# Patient Record
Sex: MALE | Race: WHITE | NOT HISPANIC OR LATINO | Employment: FULL TIME
[De-identification: names, ages, dates, MRNs, and addresses within clinical notes are randomized per-mention and may not be internally consistent; named-entity substitution may affect disease eponyms.]

---

## 2020-11-29 ENCOUNTER — HEALTH MAINTENANCE LETTER (OUTPATIENT)
Age: 35
End: 2020-11-29

## 2021-09-19 ENCOUNTER — HEALTH MAINTENANCE LETTER (OUTPATIENT)
Age: 36
End: 2021-09-19

## 2021-10-25 ENCOUNTER — LAB (OUTPATIENT)
Dept: LAB | Facility: CLINIC | Age: 36
End: 2021-10-25
Payer: COMMERCIAL

## 2021-10-25 DIAGNOSIS — Z31.440 ENCOUNTER OF MALE FOR TESTING FOR GENETIC DISEASE CARRIER STATUS FOR PROCREATIVE MANAGEMENT: ICD-10-CM

## 2021-10-25 DIAGNOSIS — Z13.71 SCREENING FOR GENETIC DISEASE CARRIER STATUS: ICD-10-CM

## 2021-10-25 DIAGNOSIS — Z71.83 ENCOUNTER FOR NONPROCREATIVE GENETIC COUNSELING: ICD-10-CM

## 2021-10-25 DIAGNOSIS — Z71.83 ENCOUNTER FOR NONPROCREATIVE GENETIC COUNSELING: Primary | ICD-10-CM

## 2021-10-25 PROCEDURE — 36415 COLL VENOUS BLD VENIPUNCTURE: CPT

## 2021-10-25 NOTE — PROGRESS NOTES
"2021    Shree \"Neo\" LIEN Padgett accompanied his wife, Alyssa Padgett, to her Maternal Fetal Medicine (MFM) genetic counseling appointment today as part of her high risk pregnancy care with our team. During today's consult, Shree Padgett and his partner each elected to proceed with expanded carrier screening via [employer] to better understand reproductive risks to the current pregnancy, their existing children together, and any possible future pregnancies. Specifically, after a review of the benefits, risks, utility, and limitations of carrier screening, Neo consented to undergoing the comprehensive carrier screening panel via [employer] (includes 289 conditions for males).     The patient reports that he and the mother of the pregnancy have  ancestry. Neo is of Iraqi, Bahraini, Syrian, and Polish ancestry; Alyssa is of Cam[place]onian Egyptian, English, and mixed  ancestry:      Cystic fibrosis is an autosomal recessive genetic condition that occurs with increased frequency in individuals of  ancestry and carrier screening for this condition is available.  In addition,  screening in the [facility] includes cystic fibrosis.        Expanded carrier screening for mutations in a large panel of genes associated with autosomal recessive conditions including cystic fibrosis, Thalassemias, hearing loss, spinal muscular atrophy, and others, is now available. We also reviewed that expanded carrier screening can assess for common X-linked recessive disorders such as Fragile X syndrome.       We discussed that expanded carrier screening is designed to identify carrier status for conditions that are primarily childhood or adolescent onset. Expanded carrier screening does not evaluate for adult-onset conditions such as hereditary cancer syndromes, Union disease, dementia/ Alzheimer's disease, or cardiovascular disease risk factors. Additionally, expanded carrier " screening is not comprehensive for all known genetic diseases or inherited conditions. This is a screening test, and residual carrier status risk figures will be provided to the patient after results become available. Carrier screening is not meant to diagnose the patient with a condition, and generally carriers are asymptomatic. However, certain genes may confer increased risks for various health concerns in carriers (for example, but not limited to: TITA, FMR1, DMD).      Shree Padgett has provided permission for [facility] genetic counseling to call Alyssa with the couple's combined carrier screening results once available in 2-3 weeks from today. Consent to communicate forms were completed in-person and are scanned into the couple's respective charts. Alyssa has requested that a callback number only alerting her that the couple's results are available for review with a genetic counselor be left at her mobile telephone number. The couple have agreed to have [facility] genetic counseling reach out after both of the couple's results are back from [facility]. All of Neo's and his partner's questions were addressed to their apparent satisfaction today.     Total in-person consult time for carrier screening: 15 minutes of a total 45 minute genetic counseling consult.     Haritha Farmer MS, [facility]  Genetic Counselor  [facility]  Maternal Fetal Medicine  Ph: [phone] ([place])  Ph: [phone] ([facility])

## 2021-11-12 ENCOUNTER — DOCUMENTATION ONLY (OUTPATIENT)
Dept: MATERNAL FETAL MEDICINE | Facility: CLINIC | Age: 36
End: 2021-11-12
Payer: COMMERCIAL

## 2021-11-12 NOTE — PROGRESS NOTES
"November 11, 2021    The following information was taken from Neo's wife's chart per couple request for disclosure of their combined expanded carrier screening results. Neo's results can be found scanned in under the laboratory tab in his chart     \"I called the patient to discuss her and her  Neo's [facility] genetics carrier screening results. The couple were identified as carriers for a variety of autosomal recessive conditions, however they were not found to be carriers for the same condition on the comprehensive 289-gene carrier screening panel. Therefore the couple's reproductive risks for having a child with any one of the conditions screened for on this panel have been significantly reduced.      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 Galactosemia is an autosomal recessive condition caused by harmful changes in the GALT gene. Clinical features of the condition include the inability to break down galactose which can lead to organ damage, lethargy, jaundice, cataracts, intellectual disabilities, and liver damage shortly after birth. Galactose is commonly found in many dairy products and many baby formulas. Treatment of this condition involves a low galactose diet to prevent onset of symptoms. The Beatty variant, when in trans with another disease causing variant in GALT, is expected to lead to a more mild-moderate form of the hereditary condition.      Because Neo was not determined to be a carrier for this autosomal recessive condition, remaining residual risks for the couple to have an affected child with Galactosemia is 1 in 39,000 (1/9900 x 1/4).     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 Ceroid lipofuscinosis, neuronal type 1 (CLN1) is a neurodegenerative condition " resulting from storage material damaging brain cells (cerebral and cerebellar atrophy). Age of onset can vary. Classic infantile CLN1 typically presents between six and 24 months of age with progressive loss of motor skills which causes problems with balance and coordination (ataxia), as well as low muscle tone (hypotonia), developmental delay, loss of cognitive abilities, difficulty coordinating speech (dysarthria), vision loss, and seizures. Life span is significantly reduced, with few individuals living beyond the early teen years.     Because Neo was not determined to be a carrier for this autosomal recessive condition, remaining residual risks for the couple to have an affected child with PPT1-related CLN is 1 in 39,000 (1/9900 x 1/4).     Neo's results  1) Neo was identified to be a carrier for [id number]-jsahfuc conditions. His specific low penetrance variant is the following: WNT10A c.682T>A (p.[id number]). [id number] conditions include autosomal recessive odonto-onycho-dermal dysplasia (OODD) and Ehvösn-Xenzmb-Drlbwgjq syndrome (SSPS) and autosomal dominant isolated tooth agenesis. Individuals with a clinically significant variant in this gene are carriers for the autosomal recessive conditions and may be at risk to develop the autosomal dominant condition associated with this gene, which was reviewed with Alyssa in detail today. Please note, the c.682T>A (p.[id number]) variant identified in this individual is known to have low penetrance for both the associated autosomal recessive and autosomal dominant conditions. This means that not all individuals with this genetic change will show signs or symptoms of the condition. Alyssa shared today that Neo's mother had missing permanent teeth that did not come in as she got older, possibly suggesting that this variant may have come from her and was then passed onto Neo, however this cannot be confirmed unless she were to undergo carrier screening for herself.  "Alyssa reports that Neo did not/does not have issues with congenital absence of his secondary teeth. Alyssa was able to verbalize her understanding that if a child of her and Neo's were to inherit this variant, they may be at risk for isolated tooth agenesis.      Because Alyssa was not determined to be a carrier for this condition, remaining residual risks for the couple to have an affected child with RTA50Y-vaqwgab conditions is 1 in 121,600 (1/30400 x 1/4).     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 The presence of a pseudodeficiency allele does not impact this individual's risk to be a carrier. Individuals with pseudodeficiency alleles may exhibit false positive results on related biochemical tests, including  screening; however, pseudodeficiency alleles are not known to cause disease, including Krabbe disease or metachromatic leukodystrophy. Carrier testing for the reproductive partner is not indicated.     We discussed that the couple can share this information with relatives if they feel comfortable. Siblings of the couple would be at a 50% chance of also being a carrier for the same condition. Alyssa wished for their results to be released to her from [employer] and provided verbal permission for this to occur via e-mail. Per patient request I released the results.\"         Haritha Farmer MS, [facility]  Genetic Counselor  [facility]  Maternal Fetal Medicine  Ph: [phone]  "

## 2022-01-09 ENCOUNTER — HEALTH MAINTENANCE LETTER (OUTPATIENT)
Age: 37
End: 2022-01-09

## 2022-08-24 ASSESSMENT — ENCOUNTER SYMPTOMS
WEAKNESS: 0
CONSTIPATION: 0
PALPITATIONS: 0
DIARRHEA: 0
HEARTBURN: 0
HEMATOCHEZIA: 0
MYALGIAS: 0
SORE THROAT: 0
DYSURIA: 0
FREQUENCY: 0
JOINT SWELLING: 0
NERVOUS/ANXIOUS: 1
ARTHRALGIAS: 1
DIZZINESS: 0
CHILLS: 0
NAUSEA: 0
FEVER: 0
EYE PAIN: 0
ABDOMINAL PAIN: 0
COUGH: 0
PARESTHESIAS: 0
SHORTNESS OF BREATH: 0
HEADACHES: 0
HEMATURIA: 0

## 2022-08-25 ENCOUNTER — OFFICE VISIT (OUTPATIENT)
Dept: FAMILY MEDICINE | Facility: CLINIC | Age: 37
End: 2022-08-25
Payer: COMMERCIAL

## 2022-08-25 VITALS
RESPIRATION RATE: 16 BRPM | OXYGEN SATURATION: 95 % | WEIGHT: 222.8 LBS | TEMPERATURE: 97 F | HEART RATE: 68 BPM | SYSTOLIC BLOOD PRESSURE: 123 MMHG | BODY MASS INDEX: 29.53 KG/M2 | HEIGHT: 73 IN | DIASTOLIC BLOOD PRESSURE: 81 MMHG

## 2022-08-25 DIAGNOSIS — M25.50 MULTIPLE JOINT PAIN: ICD-10-CM

## 2022-08-25 DIAGNOSIS — Z30.09 VASECTOMY EVALUATION: ICD-10-CM

## 2022-08-25 DIAGNOSIS — F40.248 FEAR OF PUBLIC SPEAKING: ICD-10-CM

## 2022-08-25 DIAGNOSIS — L98.9 SKIN LESION: ICD-10-CM

## 2022-08-25 DIAGNOSIS — M25.531 RIGHT WRIST PAIN: ICD-10-CM

## 2022-08-25 DIAGNOSIS — Z11.59 NEED FOR HEPATITIS C SCREENING TEST: ICD-10-CM

## 2022-08-25 DIAGNOSIS — Z00.00 ROUTINE GENERAL MEDICAL EXAMINATION AT A HEALTH CARE FACILITY: Primary | ICD-10-CM

## 2022-08-25 DIAGNOSIS — Z13.220 SCREENING FOR HYPERLIPIDEMIA: ICD-10-CM

## 2022-08-25 LAB
ANION GAP SERPL CALCULATED.3IONS-SCNC: 5 MMOL/L (ref 3–14)
BUN SERPL-MCNC: 12 MG/DL (ref 7–30)
CALCIUM SERPL-MCNC: 9.2 MG/DL (ref 8.5–10.1)
CCP AB SER IA-ACNC: 0.8 U/ML
CHLORIDE BLD-SCNC: 107 MMOL/L (ref 94–109)
CHOLEST SERPL-MCNC: 210 MG/DL
CO2 SERPL-SCNC: 28 MMOL/L (ref 20–32)
CREAT SERPL-MCNC: 0.92 MG/DL (ref 0.66–1.25)
CRP SERPL-MCNC: <2.9 MG/L (ref 0–8)
ERYTHROCYTE [SEDIMENTATION RATE] IN BLOOD BY WESTERGREN METHOD: 3 MM/HR (ref 0–15)
FASTING STATUS PATIENT QL REPORTED: YES
GFR SERPL CREATININE-BSD FRML MDRD: >90 ML/MIN/1.73M2
GLUCOSE BLD-MCNC: 100 MG/DL (ref 70–99)
HCV AB SERPL QL IA: NONREACTIVE
HDLC SERPL-MCNC: 53 MG/DL
LDLC SERPL CALC-MCNC: 136 MG/DL
NONHDLC SERPL-MCNC: 157 MG/DL
POTASSIUM BLD-SCNC: 4.4 MMOL/L (ref 3.4–5.3)
SODIUM SERPL-SCNC: 140 MMOL/L (ref 133–144)
TRIGL SERPL-MCNC: 104 MG/DL

## 2022-08-25 PROCEDURE — 99385 PREV VISIT NEW AGE 18-39: CPT | Performed by: NURSE PRACTITIONER

## 2022-08-25 PROCEDURE — 85652 RBC SED RATE AUTOMATED: CPT | Performed by: NURSE PRACTITIONER

## 2022-08-25 PROCEDURE — 80061 LIPID PANEL: CPT | Performed by: NURSE PRACTITIONER

## 2022-08-25 PROCEDURE — 86803 HEPATITIS C AB TEST: CPT | Performed by: NURSE PRACTITIONER

## 2022-08-25 PROCEDURE — 80048 BASIC METABOLIC PNL TOTAL CA: CPT | Performed by: NURSE PRACTITIONER

## 2022-08-25 PROCEDURE — 86038 ANTINUCLEAR ANTIBODIES: CPT | Performed by: NURSE PRACTITIONER

## 2022-08-25 PROCEDURE — 86431 RHEUMATOID FACTOR QUANT: CPT | Performed by: NURSE PRACTITIONER

## 2022-08-25 PROCEDURE — 86039 ANTINUCLEAR ANTIBODIES (ANA): CPT | Performed by: NURSE PRACTITIONER

## 2022-08-25 PROCEDURE — 86140 C-REACTIVE PROTEIN: CPT | Performed by: NURSE PRACTITIONER

## 2022-08-25 PROCEDURE — 86618 LYME DISEASE ANTIBODY: CPT | Performed by: NURSE PRACTITIONER

## 2022-08-25 PROCEDURE — 36415 COLL VENOUS BLD VENIPUNCTURE: CPT | Performed by: NURSE PRACTITIONER

## 2022-08-25 PROCEDURE — 86200 CCP ANTIBODY: CPT | Performed by: NURSE PRACTITIONER

## 2022-08-25 PROCEDURE — 99214 OFFICE O/P EST MOD 30 MIN: CPT | Mod: 25 | Performed by: NURSE PRACTITIONER

## 2022-08-25 RX ORDER — PROPRANOLOL HYDROCHLORIDE 10 MG/1
TABLET ORAL
Qty: 20 TABLET | Refills: 1 | Status: SHIPPED | OUTPATIENT
Start: 2022-08-25 | End: 2023-06-30

## 2022-08-25 RX ORDER — PROPRANOLOL HYDROCHLORIDE 10 MG/1
10 TABLET ORAL PRN
COMMUNITY
Start: 2021-01-29 | End: 2022-08-25

## 2022-08-25 ASSESSMENT — ENCOUNTER SYMPTOMS
JOINT SWELLING: 0
FREQUENCY: 0
EYE PAIN: 0
PARESTHESIAS: 0
HEADACHES: 0
NAUSEA: 0
DIZZINESS: 0
FEVER: 0
DYSURIA: 0
DIARRHEA: 0
SORE THROAT: 0
HEMATURIA: 0
MYALGIAS: 0
COUGH: 0
PALPITATIONS: 0
HEMATOCHEZIA: 0
SHORTNESS OF BREATH: 0
CHILLS: 0
NERVOUS/ANXIOUS: 1
HEARTBURN: 0
CONSTIPATION: 0
WEAKNESS: 0
ABDOMINAL PAIN: 0
ARTHRALGIAS: 1

## 2022-08-25 ASSESSMENT — ANXIETY QUESTIONNAIRES
5. BEING SO RESTLESS THAT IT IS HARD TO SIT STILL: SEVERAL DAYS
6. BECOMING EASILY ANNOYED OR IRRITABLE: SEVERAL DAYS
2. NOT BEING ABLE TO STOP OR CONTROL WORRYING: SEVERAL DAYS
GAD7 TOTAL SCORE: 6
7. FEELING AFRAID AS IF SOMETHING AWFUL MIGHT HAPPEN: NOT AT ALL
3. WORRYING TOO MUCH ABOUT DIFFERENT THINGS: SEVERAL DAYS
IF YOU CHECKED OFF ANY PROBLEMS ON THIS QUESTIONNAIRE, HOW DIFFICULT HAVE THESE PROBLEMS MADE IT FOR YOU TO DO YOUR WORK, TAKE CARE OF THINGS AT HOME, OR GET ALONG WITH OTHER PEOPLE: SOMEWHAT DIFFICULT
GAD7 TOTAL SCORE: 6
1. FEELING NERVOUS, ANXIOUS, OR ON EDGE: SEVERAL DAYS

## 2022-08-25 ASSESSMENT — PAIN SCALES - GENERAL: PAINLEVEL: NO PAIN (0)

## 2022-08-25 ASSESSMENT — PATIENT HEALTH QUESTIONNAIRE - PHQ9: 5. POOR APPETITE OR OVEREATING: SEVERAL DAYS

## 2022-08-25 NOTE — PROGRESS NOTES
SUBJECTIVE:   CC: Shree Padgett is an 37 year old male who presents for preventative health visit.       Patient has been advised of split billing requirements and indicates understanding: Yes  Healthy Habits:     Getting at least 3 servings of Calcium per day:  Yes    Bi-annual eye exam:  NO    Dental care twice a year:  Yes    Sleep apnea or symptoms of sleep apnea:  None    Diet:  Regular (no restrictions)    Frequency of exercise:  2-3 days/week    Duration of exercise:  15-30 minutes    Taking medications regularly:  No    Barriers to taking medications:  None    Medication side effects:  Not applicable    PHQ-2 Total Score: 0    Additional concerns today:  Yes        PROBLEMS TO ADD ON...  -------------------------------------  Referral for vasectomy     Discuss situational anxiety - takes propranolol PRN  KANDICE-7 SCORE 8/25/2022   Total Score 6     Referral for family/couples therapy - wife just had a baby     Today's PHQ-2 Score:   PHQ-2 ( 1999 [employer]) 8/24/2022   Q1: Little interest or pleasure in doing things 0   Q2: Feeling down, depressed or hopeless 0   PHQ-2 Score 0   Q1: Little interest or pleasure in doing things Not at all   Q2: Feeling down, depressed or hopeless Not at all   PHQ-2 Score 0       Abuse: Current or Past(Physical, Sexual or Emotional)- No  Do you feel safe in your environment? Yes    Have you ever done Advance Care Planning? (For example, a Health Directive, POLST, or a discussion with a medical provider or your loved ones about your wishes): No, advance care planning information given to patient to review.  Patient declined advance care planning discussion at this time.    Social History     Tobacco Use     Smoking status: Former Smoker     Packs/day: 1.00     Years: 15.00     Pack years: 15.00     Types: Cigarettes, Dip, chew, snus or snuff     Start date: 1/1/2004     Quit date: 1/1/2019     Years since quitting: 3.6     Smokeless tobacco: Former User     Quit date: 1/1/2019    Substance Use Topics     Alcohol use: Yes         Alcohol Use 8/24/2022   Prescreen: >3 drinks/day or >7 drinks/week? Yes   AUDIT SCORE  8       Last PSA: No results found for: PSA    Reviewed orders with patient. Reviewed health maintenance and updated orders accordingly - Yes  Current Outpatient Medications   Medication Sig Dispense Refill     Multiple Vitamin (ONE-A-DAY MENS PO) Take 1 tablet by mouth daily       propranolol (INDERAL) 10 MG tablet Take 1 tablet 15 to 20 minutes prior to speaking event. 20 tablet 1     No Known Allergies    Reviewed and updated as needed this visit by clinical staff   Tobacco  Allergies  Meds  Problems  Med Hx  Surg Hx  Fam Hx  Soc   Hx          Reviewed and updated as needed this visit by Provider      Problems                 Here today for physical.  Is fasting for labs.  Needs to have refill of propanolol.Takes propanolol as needed prior to giving presentations. Thinks takes about once per month.  It works very well when has to speak in public.    Has 2 children.  Wife does not desire any more children.  Would like to be referred for vasectomy.    All joints are aching. They do not get swollen. Does not take tylenol or ibuprofen for these pains.  Has had labs checked in the past that were normal. Does play volleyball and other sports and will not be able to play due the pain in right wrist.  Is right-handed. Does do a lot of typing during the day. Will rub it and that makes it worse. Will put ice on it and that does help. Will get numbness and tingling to arms and hands. Will wake up and have the numbness.     Mom with ovarian cancer, sister with brain cancer    Last PSA: Never, no family history  Last Colonoscopy: Never, no family history  Last eye exam: Some time agop  Last dental exam: Every 6 mo  Last tetanus vaccine: 2013  Last influenza vaccine: Gets in the fall  Last shingles vaccine: Not applicable  Last pneumonia vaccine: Not applicable  Last COVID vaccine:  "done   Last COVID booster: done 11/21  Hep C screen: Plans to do here today  HIV screen: has been in the past, declines re screening.   AAA screen (age 65-75 with smoking hx): Not applicable  IVD (HTN, Hyperlipid, Smoking): Not applicable not applicable  Lung CA screening (50-77, 20 pk smoking hx) Quit within 15 years: Not applicable      Review of Systems   Constitutional: Negative for chills and fever.   HENT: Negative for congestion, ear pain, hearing loss and sore throat.    Eyes: Negative for pain and visual disturbance.   Respiratory: Negative for cough and shortness of breath.    Cardiovascular: Negative for chest pain, palpitations and peripheral edema.   Gastrointestinal: Negative for abdominal pain, constipation, diarrhea, heartburn, hematochezia and nausea.   Genitourinary: Negative for dysuria, frequency, genital sores, hematuria, impotence, penile discharge and urgency.   Musculoskeletal: Positive for arthralgias (multiple joints, right wrist worst). Negative for joint swelling and myalgias.   Skin: Negative for rash.        Spot to right cheek   Neurological: Negative for dizziness, weakness, headaches and paresthesias.   Psychiatric/Behavioral: Negative for mood changes. The patient is nervous/anxious.        OBJECTIVE:   /81 (BP Location: Left arm, Patient Position: Sitting, Cuff Size: Adult Regular)   Pulse 68   Temp 97  F (36.1  C) (Tympanic)   Resp 16   Ht 1.865 m (6' 1.43\")   Wt 101.1 kg (222 lb 12.8 oz)   SpO2 95%   BMI 29.06 kg/m      Physical Exam  Constitutional:       Appearance: He is well-developed.   HENT:      Right Ear: Tympanic membrane and external ear normal. No middle ear effusion. Tympanic membrane is not erythematous.      Left Ear: Tympanic membrane and external ear normal.  No middle ear effusion. Tympanic membrane is not erythematous.      Mouth/Throat:      Pharynx: Uvula midline.   Eyes:      Pupils: Pupils are equal, round, and reactive to light.   Neck:      " Thyroid: No thyromegaly.      Vascular: No carotid bruit.   Cardiovascular:      Rate and Rhythm: Normal rate and regular rhythm.      Pulses:           Femoral pulses are 2+ on the right side and 2+ on the left side.     Heart sounds: Normal heart sounds.   Pulmonary:      Effort: Pulmonary effort is normal.      Breath sounds: Normal breath sounds.   Abdominal:      General: Bowel sounds are normal. There is no distension.      Palpations: Abdomen is soft.      Tenderness: There is no abdominal tenderness.   Musculoskeletal:         General: Normal range of motion.      Right wrist: Tenderness and bony tenderness present. No swelling, deformity, effusion, lacerations or snuff box tenderness. Normal range of motion.   Skin:     General: Skin is warm and dry.          Neurological:      Mental Status: He is alert.         ASSESSMENT/PLAN:   Routine general medical examination at a health care facility  Screening guidelines reviewed.   - Basic metabolic panel; Future  - Basic metabolic panel    Need for hepatitis C screening test  - Hepatitis C Screen Reflex to HCV RNA Quant and Genotype; Future  - Hepatitis C Screen Reflex to HCV RNA Quant and Genotype    Screening for hyperlipidemia  - Lipid panel reflex to direct LDL Non-fasting; Future  - Lipid panel reflex to direct LDL Non-fasting    Skin lesion  - Adult Dermatology Referral; Future    Vasectomy evaluation  - Adult Urology  Referral; Future    Multiple joint pain  Will check labs here today.  Consider referral to rheumatology in the future  - Anti Nuclear Cindy IgG by IFA with Reflex; Future  - Rheumatoid factor; Future  - Cyclic Citrullinated Peptide Antibody IgG; Future  - CRP inflammation; Future  - Erythrocyte sedimentation rate auto; Future  - Erythrocyte sedimentation rate auto  - CRP inflammation  - Cyclic Citrullinated Peptide Antibody IgG  - Rheumatoid factor  - Anti Nuclear Cindy IgG by IFA with Reflex  - Lyme Disease Total Abs Bld with Reflex to  "Confirm CLIA; Future  - Lyme Disease Total Abs Bld with Reflex to Confirm CLIA    Right wrist pain  - Orthopedic  Referral; Future    Fear of public speaking  Using as needed and working well.  Refills given.  Plan to follow-up in 1 year.  - propranolol (INDERAL) 10 MG tablet; Take 1 tablet 15 to 20 minutes prior to speaking event.    Patient has been advised of split billing requirements and indicates understanding: Yes    COUNSELING:   Reviewed preventive health counseling, as reflected in patient instructions       Regular exercise       Healthy diet/nutrition       Immunizations    Immunizations up-to-date           Family planning       Consider Hep C screening for all patients one time for ages 18-79 years       HIV screeninx in teen years, 1x in adult years, and at intervals if high risk       Colorectal cancer screening       Prostate cancer screening    Estimated body mass index is 29.06 kg/m  as calculated from the following:    Height as of this encounter: 1.865 m (6' 1.43\").    Weight as of this encounter: 101.1 kg (222 lb 12.8 oz).     Weight management plan: Discussed healthy diet and exercise guidelines    He reports that he quit smoking about 3 years ago. His smoking use included cigarettes and dip, chew, snus or snuff. He started smoking about 18 years ago. He has a 15.00 pack-year smoking history. He quit smokeless tobacco use about 3 years ago.      Counseling Resources:  ATP IV Guidelines  Pooled Cohorts Equation Calculator  FRAX Risk Assessment  ICSI Preventive Guidelines  Dietary Guidelines for Americans,   USDA's MyPlate  ASA Prophylaxis  Lung CA Screening    WENDIE Emery CNP  [facility]  "

## 2022-08-26 LAB
ANA PAT SER IF-IMP: ABNORMAL
ANA SER QL IF: ABNORMAL
ANA TITR SER IF: ABNORMAL {TITER}
B BURGDOR IGG+IGM SER QL: 0.07
RHEUMATOID FACT SER NEPH-ACNC: <6 IU/ML

## 2022-10-07 ENCOUNTER — VIRTUAL VISIT (OUTPATIENT)
Dept: UROLOGY | Facility: CLINIC | Age: 37
End: 2022-10-07
Attending: NURSE PRACTITIONER
Payer: COMMERCIAL

## 2022-10-07 DIAGNOSIS — Z30.09 VASECTOMY EVALUATION: ICD-10-CM

## 2022-10-07 PROCEDURE — 99203 OFFICE O/P NEW LOW 30 MIN: CPT | Mod: GT | Performed by: UROLOGY

## 2022-10-07 NOTE — PATIENT INSTRUCTIONS
[facility]  [address]  [place] [address]   Your Vasectomy is scheduled 11/30/22 arrive 10:00 for 10:15 procedure .  Please call [phone] if you need to reschedule this appointment or if you have any questions.     Preparation for Vasectomy:  Eat and drink normally prior to your appointment.  Shave the hair away from the base of your penis and around your testicles.  Wear snug underwear the day of the vasectomy to support your testicles.  Do not take aspirin, ibuprofen, advil, motrin, aleve products one week prior to your vasectomy.        General Vasectomy Information    Vasectomy is a surgery.  If it is successful, it will be impossible for you to ever father children.  The following information regards the male sterilization done by an operation called a vasectomy.  This is done in the physician's office.    The operation done to sterilize the male is easier, safer and much less expensive than the operation done to sterilize the woman.    Sterilization should be considered permanent.  For most males, once the operation is done, it can never me undone.  There are attempts made occasionally to reconnect the tubes that have been cut during the procedure, but this is very difficult and expensive and works only about 50-70% of the time.  In order for any of the physicians in our clinic to do a vasectomy, we require that you consider this a permanent form a sterilization.    A vasectomy can be done at any time, but it is best to think about having it done when you can take at least one day off from work and any excess activities.    Your decision to have a vasectomy should only be made with the following facts clear in mind.    1. First, a vasectomy is only one of several means of birth control.  Many form of temporary contraception are available.  If you have any questions about other methods, please discuss this with your physician.    2. A vasectomy may be unsuccessful in approximately one out  of 1000 couples per year.  This occurs when the tubes which are cut during the procedure reconnect themselves.  Sterility cannot be guaranteed.    3. You should be aware that it is the current belief of the medical profession that a vasectomy procedure does not alter a male physically, physiologically or sexually.  Because each person is a unique individual, there is always the possibility of an adverse phychiatric reaction.  This can be best avoided by being very comfortable in your own mind that you want to have this done, and that you do not want to father any children in the future.  If this is not clear in your mind, this should be further discussed with your physician.    4. You will not notice a change in the volume of your ejaculate since sperm is a very small amount of the semen and it is only the sperm that is stopped from entering the ejaculate after a vasectomy.  Your prostate and seminal vesicle glands really supply most of the semen and this is not at all decreased after a vasectomy.  Also there is no effect on the male hormones.    5. You do not become sterile immediately following a vasectomy due to the fact that there is still sperm remaining in your system that must be eliminated by ejaculation.  For this reason, your sexual partner could still become pregnant for a period of time following the vasectomy operation.  It is necessary that contraceptive measures be used until you receive confirmation from your physician that you are sterile.  It takes approximately 12 ejaculations to clear the semen of sperm, but this can differ in different men.  For this reason, it is very important that your semen be checked for sperm before you are considered sterile, and this should be done approximately 12 weeks after your vasectomy.      6. Vasectomy has risks and benefits.  Among the risks are possible complications resulting from the procedure.  These risks include but are not limited to:   A.  Bleeding,  infection, or hematoma occuring during or in the recovery period   from the procedure.   B.  Sperm granuloma or a small pea to walnut sized lump which is a collection of   scar tissue and sperm in your scrotal sack and remains permanently   C.  There may be an increased risk of prostate cancer although the data is   unclear.

## 2022-10-07 NOTE — PROGRESS NOTES
Neo is a 37 year old who is being evaluated via a billable video visit.      How would you like to obtain your AVS?   If the video visit is dropped, the invitation should be resent by:   Will anyone else be joining your video visit?               Subjective   Neo is a 37 year old, presenting for the following health issues:  Video Visit      HPI     Patient was requested by Liz Molina for a vasectomy consult.  He has 2 children.    Review of Systems   Constitutional, HEENT, cardiovascular, pulmonary, gi and gu systems are negative, except as otherwise noted.      Objective           Vitals:  No vitals were obtained today due to virtual visit.    Physical Exam   GENERAL: Healthy, alert and no distress  EYES: Eyes grossly normal to inspection.  No discharge or erythema, or obvious scleral/conjunctival abnormalities.  RESP: No audible wheeze, cough, or visible cyanosis.  No visible retractions or increased work of breathing.    SKIN: Visible skin clear. No significant rash, abnormal pigmentation or lesions.  NEURO: Cranial nerves grossly intact.  Mentation and speech appropriate for age.  PSYCH: Mentation appears normal, affect normal/bright, judgement and insight intact, normal speech and appearance well-groomed.    Discussed    That vasectomy is permanent method of birth control.    That vasectomy can fail due to recanalization of the vas even many months/years later.    That he needs 2 negative sperm checks to be considered sterile    That there are other methods that are not permanent and also that the sperm can be frozen for later use.    How the technique is performed, risks of infection, bleeding, damage to the testes vessels and testes atrophy    Long term complication such as chronic and difficult to treat testes pain and questionable increase incidence of prostate cancer    That the procedure can be done at the clinic or hospital OR        Plan:    Stop Aspirin  Will schedule Vasectomy in the  future              Video-Visit Details    Video Start Time: 1035    Type of service:  Video Visit    Video End Time:10:46 AM    Originating Location (pt. Location): Home    Distant Location (provider location):  [facility]     Platform used for Video Visit: Pradip

## 2022-11-29 ENCOUNTER — OFFICE VISIT (OUTPATIENT)
Dept: UROLOGY | Facility: CLINIC | Age: 37
End: 2022-11-29
Payer: COMMERCIAL

## 2022-11-29 DIAGNOSIS — Z30.2 ENCOUNTER FOR VASECTOMY: Primary | ICD-10-CM

## 2022-11-29 PROCEDURE — 88302 TISSUE EXAM BY PATHOLOGIST: CPT | Performed by: PATHOLOGY

## 2022-11-29 PROCEDURE — 55250 REMOVAL OF SPERM DUCT(S): CPT | Performed by: UROLOGY

## 2022-11-29 PROCEDURE — 99000 SPECIMEN HANDLING OFFICE-LAB: CPT | Performed by: UROLOGY

## 2022-12-01 LAB
PATH REPORT.COMMENTS IMP SPEC: NORMAL
PATH REPORT.COMMENTS IMP SPEC: NORMAL
PATH REPORT.FINAL DX SPEC: NORMAL
PATH REPORT.GROSS SPEC: NORMAL
PATH REPORT.MICROSCOPIC SPEC OTHER STN: NORMAL
PATH REPORT.RELEVANT HX SPEC: NORMAL
PHOTO IMAGE: NORMAL

## 2023-06-30 DIAGNOSIS — F40.248 FEAR OF PUBLIC SPEAKING: ICD-10-CM

## 2023-06-30 RX ORDER — PROPRANOLOL HYDROCHLORIDE 10 MG/1
TABLET ORAL
Qty: 20 TABLET | Refills: 1 | Status: SHIPPED | OUTPATIENT
Start: 2023-06-30 | End: 2024-07-01

## 2023-07-21 ENCOUNTER — NURSE TRIAGE (OUTPATIENT)
Dept: FAMILY MEDICINE | Facility: CLINIC | Age: 38
End: 2023-07-21
Payer: COMMERCIAL

## 2023-07-21 NOTE — TELEPHONE ENCOUNTER
"Patient calling, says he has been waking with both arms and both hands feeling numb and tingly.  Goes away when he moves or gets up but always feels a little \"funny\" under both arm pits.  Started a couple months ago, more consistent last 2 weeks.      See triage below:    SEE IN OFFICE WITHIN 3 DAYS:   * You need to be examined.   * Let me give you an appointment.    Scheduled for 7/25 with [facility].        Anita Miranda RN  [facility]    Reason for Disposition    Numbness or tingling in one or both hands is a chronic symptom (recurrent or ongoing problem lasting > 4 weeks)    Additional Information    Negative: Difficult to awaken or acting confused (e.g., disoriented, slurred speech)    Negative: New neurologic deficit that is present NOW, sudden onset of ANY of the following: * Weakness of the face, arm, or leg on one side of the body* Numbness of the face, arm, or leg on one side of the body* Loss of speech or garbled speech    Negative: Sounds like a life-threatening emergency to the triager    Negative: Confusion, disorientation, or hallucinations is main symptom    Negative: Dizziness is main symptom    Negative: Followed a head injury within last 3 days    Negative: Headache (with neurologic deficit)    Negative: Unable to urinate (or only a few drops) and bladder feels very full    Negative: Loss of bladder or bowel control (urine or bowel incontinence; wetting self, leaking stool) of new-onset    Negative: Back pain with numbness (loss of sensation) in groin or rectal area    Negative: Neurologic deficit that was brief (now gone), ANY of the following: * Weakness of the face, arm, or leg on one side of the body * Numbness of the face, arm, or leg on one side of the body * Loss of speech or garbled speech    Negative: Patient sounds very sick or weak to the triager    Negative: Neurologic deficit of gradual onset (e.g., days to weeks), ANY of the following: * Weakness of the " "face, arm, or leg on one side of the body* Numbness of the face, arm, or leg on one side of the body* Loss of speech or garbled speech    Negative: [place] palsy suspected (i.e., weakness only one side of the face, developing over hours to days, no other symptoms)    Negative: Tingling (e.g., pins and needles) of the face, arm or leg on one side of the body, that is present now (Exceptions: Chronic or recurrent symptom lasting > 4 weeks; or tingling from known cause, such as: bumped elbow, carpal tunnel syndrome, pinched nerve, frostbite.)    Negative: Neck pain (with neurologic deficit)    Negative: Back pain (with neurologic deficit)    Negative: Patient wants to be seen    Answer Assessment - Initial Assessment Questions  1. SYMPTOM: \"What is the main symptom you are concerned about?\" (e.g., weakness, numbness)      Bilateral numbness and tingling both arms and hands  2. ONSET: \"When did this start?\" (minutes, hours, days; while sleeping)      First noted a couple months ago, more consistent, every AM   3. LAST NORMAL: \"When was the last time you (the patient) were normal (no symptoms)?\"      A couple months ago  4. PATTERN \"Does this come and go, or has it been constant since it started?\"  \"Is it present now?\"      Numbness goes away but always feels like \"something is rubbing a nerve in his armpit\"  5. CARDIAC SYMPTOMS: \"Have you had any of the following symptoms: chest pain, difficulty breathing, palpitations?\"      no  6. NEUROLOGIC SYMPTOMS: \"Have you had any of the following symptoms: headache, dizziness, vision loss, double vision, changes in speech, unsteady on your feet?\"      no  7. OTHER SYMPTOMS: \"Do you have any other symptoms?\"      no  8. PREGNANCY: \"Is there any chance you are pregnant?\" \"When was your last menstrual period?\"      n/a    Protocols used: NEUROLOGIC DEFICIT-A-[place]      "

## 2023-07-25 ENCOUNTER — OFFICE VISIT (OUTPATIENT)
Dept: FAMILY MEDICINE | Facility: CLINIC | Age: 38
End: 2023-07-25
Payer: COMMERCIAL

## 2023-07-25 ENCOUNTER — ANCILLARY PROCEDURE (OUTPATIENT)
Dept: GENERAL RADIOLOGY | Facility: CLINIC | Age: 38
End: 2023-07-25
Attending: PHYSICIAN ASSISTANT
Payer: COMMERCIAL

## 2023-07-25 VITALS
HEIGHT: 74 IN | DIASTOLIC BLOOD PRESSURE: 82 MMHG | BODY MASS INDEX: 28.23 KG/M2 | OXYGEN SATURATION: 95 % | RESPIRATION RATE: 18 BRPM | WEIGHT: 220 LBS | TEMPERATURE: 97.3 F | HEART RATE: 75 BPM | SYSTOLIC BLOOD PRESSURE: 124 MMHG

## 2023-07-25 DIAGNOSIS — R73.09 ELEVATED GLUCOSE: ICD-10-CM

## 2023-07-25 DIAGNOSIS — M54.12 CERVICAL RADICULOPATHY: ICD-10-CM

## 2023-07-25 DIAGNOSIS — M54.12 CERVICAL RADICULOPATHY: Primary | ICD-10-CM

## 2023-07-25 LAB — HBA1C MFR BLD: 5.4 % (ref 0–5.6)

## 2023-07-25 PROCEDURE — 83036 HEMOGLOBIN GLYCOSYLATED A1C: CPT | Performed by: PHYSICIAN ASSISTANT

## 2023-07-25 PROCEDURE — 99214 OFFICE O/P EST MOD 30 MIN: CPT | Performed by: PHYSICIAN ASSISTANT

## 2023-07-25 PROCEDURE — 36415 COLL VENOUS BLD VENIPUNCTURE: CPT | Performed by: PHYSICIAN ASSISTANT

## 2023-07-25 PROCEDURE — 72040 X-RAY EXAM NECK SPINE 2-3 VW: CPT | Mod: TC | Performed by: STUDENT IN AN ORGANIZED HEALTH CARE EDUCATION/TRAINING PROGRAM

## 2023-07-25 ASSESSMENT — ENCOUNTER SYMPTOMS
NUMBNESS: 1
SHORTNESS OF BREATH: 0
COUGH: 0
UNEXPECTED WEIGHT CHANGE: 0
WEAKNESS: 1
PARESTHESIAS: 1
BACK PAIN: 0
FEVER: 0
NECK PAIN: 0

## 2023-07-25 ASSESSMENT — PAIN SCALES - GENERAL: PAINLEVEL: MILD PAIN (2)

## 2023-07-25 NOTE — PROGRESS NOTES
"  Assessment & Plan     Cervical radiculopathy  Patient is a 38-year-old male who presents to clinic due to intermittent positional numbness/tingling in bilateral arms/hands that has been ongoing for the last 3-4 weeks and slowly worsening.  Vital signs are normal.  Physical exam without acute abnormalities.  Unable to reproduce symptoms.  Strength and sensation are intact and equal bilaterally.  Symptoms are most consistent with cervical radiculopathy.  Will complete x-ray to look for any underlying bony abnormalities.  Recommended physical therapy for treatment and referral placed.  If symptoms do not improve with 4 to 6 weeks of physical therapy, or if they continue to worsen, would consider cervical MRI.  Return precautions provided.  - XR Cervical Spine 2/3 Views; Future  - Physical Therapy Referral; Future    Elevated glucose  Patient notes history of elevated glucose during physical last year.  He would like further evaluation for possible diabetes.  Hemoglobin A A1c order placed.  Recommended follow-up with primary care provider for annual exam as I am a same-day care provider.  Patient is agreeable and referral placed.  - Hemoglobin A1c; Future  - PRIMARY CARE FOLLOW-UP SCHEDULING; Future  - Hemoglobin A1c      See Patient Instructions    [facility] PA-C  [facility] ANÍBAL Larson is a 38 year old, presenting for the following health issues:  Numbness (Patient is experiencing numbness/tingling off and on in both arms/hands. Patient stated that in the morning it is the worse, about 30 minutes of pain trying to get them to \"wake up\" or the tingling to go away. He feels it when he is moving them or extending his arm out, or trying to grab something. Patient stated that sometimes he does feel the numbness in his feet and randomly a warm feeling of something running down his left leg. Patient also advised he has a mole concern.)        7/25/2023     7:37 AM   Additional Questions " "  Roomed by Claudia DICKERSON MA   Accompanied by No one         7/25/2023     7:37 AM   Patient Reported Additional Medications   Patient reports taking the following new medications None     History of Present Illness       Reason for visit:  General checkup, triaged with nurse line about numbness in both arms and hands. Mostly in the morning. Started 3-4 weeks ago on occassion, but it has been a few days now consistently.  Symptom onset:  More than a month  Symptoms include:  Numbness in both arms and hands in the mornings - already triaged with nurse line and confirmed not an emergency.  Symptom intensity:  Mild  Symptom progression:  Staying the same  Had these symptoms before:  No  What makes it worse:  Big stretches and making fist/moving thumbs    He eats 2-3 servings of fruits and vegetables daily.He consumes 0 sweetened beverage(s) daily.He exercises with enough effort to increase his heart rate 10 to 19 minutes per day.  He exercises with enough effort to increase his heart rate 3 or less days per week.   He is taking medications regularly.     Patient notes tingling numbness in bilateral arms that is positional. Symptoms are worse with extending arms, arms overhead, or with gripping things. Symptoms feel like weakness. No pain. Symptoms improve with shaking arms out and movement. Symptoms use to be mild and go away quickly, now it can take around 30 minutes to resolve.     Patient is worried about diabetes as his lab work, glucose, came back elevated last year. Family history of Tyle II DM in grandfather.     Review of Systems   Constitutional:  Negative for fever and unexpected weight change.   Respiratory:  Negative for cough and shortness of breath.    Musculoskeletal:  Negative for back pain and neck pain.   Neurological:  Positive for weakness, numbness and paresthesias.            Objective    /82   Pulse 75   Temp 97.3  F (36.3  C) (Temporal)   Resp 18   Ht 1.876 m (6' 1.86\")   Wt 99.8 kg (220 " lb)   SpO2 95%   BMI 28.36 kg/m    Body mass index is 28.36 kg/m .  Physical Exam  Vitals and nursing note reviewed.   Constitutional:       General: He is not in acute distress.     Appearance: Normal appearance.   HENT:      Head: Normocephalic and atraumatic.   Eyes:      Extraocular Movements: Extraocular movements intact.      Pupils: Pupils are equal, round, and reactive to light.   Cardiovascular:      Rate and Rhythm: Normal rate.   Pulmonary:      Effort: Pulmonary effort is normal.   Musculoskeletal:         General: Normal range of motion.      Cervical back: Normal range of motion.      Comments: Sensation intact and equal to bilateral upper extremities.   strength 5/5 bilaterally.  Interosseous strength 5/5 bilaterally.  Shoulder strength 5/5 in IR/ER.  Shoulder range of motion WNL bilaterally.  Neck range of motion WNL in all directions without symptom reproduction.   Skin:     General: Skin is warm and dry.   Neurological:      General: No focal deficit present.      Mental Status: He is alert.   Psychiatric:         Mood and Affect: Mood normal.         Behavior: Behavior normal.

## 2023-07-25 NOTE — PATIENT INSTRUCTIONS
Your arm tingling and numbness is likely due to nerve compression within the neck.  For further evaluation we are completing an x-ray.  We will send results to your MyChart.  For treatment we recommend physical therapy.  The scheduling team will call you to arrange your appointment.  Your symptoms should improve with 6-8 weeks of physical therapy and home exercises. Follow-up in clinic for any new or worsening symptoms.  If your symptoms do not improve with this treatment plan, please follow-up for next steps.    We are checking your lab for diabetes today and will send results to [facility].    You will be contacted to schedule your yearly physical with your primary care provider.    Please reach out with questions or concerns.

## 2023-08-23 ENCOUNTER — LAB (OUTPATIENT)
Dept: LAB | Facility: CLINIC | Age: 38
End: 2023-08-23
Payer: COMMERCIAL

## 2023-08-23 ENCOUNTER — THERAPY VISIT (OUTPATIENT)
Dept: PHYSICAL THERAPY | Facility: CLINIC | Age: 38
End: 2023-08-23
Attending: PHYSICIAN ASSISTANT
Payer: COMMERCIAL

## 2023-08-23 DIAGNOSIS — Z30.2 ENCOUNTER FOR VASECTOMY: ICD-10-CM

## 2023-08-23 DIAGNOSIS — R20.2 PARESTHESIA OF UPPER EXTREMITY: Primary | ICD-10-CM

## 2023-08-23 DIAGNOSIS — M54.12 CERVICAL RADICULOPATHY: ICD-10-CM

## 2023-08-23 LAB — SEMEN ANALYSIS P VAS PNL: NORMAL

## 2023-08-23 PROCEDURE — 97162 PT EVAL MOD COMPLEX 30 MIN: CPT | Mod: GP | Performed by: PHYSICAL THERAPIST

## 2023-08-23 PROCEDURE — 89321 SEMEN ANAL SPERM DETECTION: CPT

## 2023-08-23 PROCEDURE — 97110 THERAPEUTIC EXERCISES: CPT | Mod: GP | Performed by: PHYSICAL THERAPIST

## 2023-08-23 NOTE — PROGRESS NOTES
"PHYSICAL THERAPY EVALUATION  Type of Visit: Evaluation    See electronic medical record for Abuse and Falls Screening details.    Subjective       Presenting condition or subjective complaint: Numbness and tingling in both arms frequently, especially  in the morning  Date of onset: 07/25/23 (referred to PT)    Relevant medical history:     Dates & types of surgery:      Pt states symptoms started without specific incident a few months ago and was every few days progressing to everyday.  Can last a few minutes to a few hours; not present currently.  Typically from shoulders to hands, usually R more than L.  Can sometimes be triggered by prolonged sitting.  Can occasionally wake with symptoms but not sure if is d/t symptoms.  Usually a side sleeper.  Can be relieved by \"shaking it out\" or \"stretching\" (pt demonstrated getting to good posture).  Describes symptoms as \"annoying\" but still has strength in the arms intact.  R dominant.  H/o compression fracture in lower thoracic at about age 21 and generally not having much difficulty at this point.  \"I would love to not wake up and have that feeling.\"    Prior diagnostic imaging/testing results: X-ray     Prior therapy history for the same diagnosis, illness or injury: No      Prior Level of Function  Transfers: Independent  Ambulation: Independent  ADL: Independent    Living Environment  Social support: With a significant other or spouse   Type of home: House   Stairs to enter the home: Yes 30 Is there a railing: Yes   Ramp: No   Stairs inside the home: Yes 20 Is there a railing: Yes   Help at home: None  Equipment owned:       Employment: Yes Sales  Hobbies/Interests: Hiking, biking, camping, swimming, generally pretty active hobbies    Patient goals for therapy:         Objective   CERVICAL SPINE EVALUATION  INTEGUMENTARY (edema, incisions): WNL  POSTURE: Sitting Posture: Rounded shoulders, Forward head  GAIT: Pt ambulates without device without gross asymmetry  ROM: " Cervical AROM all directions without restriction with the exception of mildly restricted retraction for single and repeated.  No symptoms provoked.  MYOTOMES: 5/5 MMT shoulder flexion, abduction, ER, IR; elbow flexion and extension; wrist flexion and extension; thumb / index and thumb / 5th opposition.  DTR S: 1+ B biceps, brachioradialis, triceps  DERMATOMES: WNL  NEURAL TENSION:  Symptoms reproduced with R > L median nerve ULTT primarily, although some with ulnar and radial as well  SPECIAL TESTS: Negative Spurling in neutral and extension.  Supine manual axial distraction reported to not change symptoms.  PALPATION: No tenderness to palpation.  SPINAL SEGMENTAL CONCLUSIONS: No gross hypo- or hypermobility facets B C3-7 and PAs of C3-T9.    Assessment & Plan   CLINICAL IMPRESSIONS  Medical Diagnosis: Cervical radiculopathy    Treatment Diagnosis: Upper extremity paresthesias   Impression/Assessment: Patient is a 38 year old male with upper extremity complaints.  The following significant findings have been identified: Pain and Impaired posture. These impairments interfere with their ability to perform self care tasks as compared to previous level of function.     Clinical Decision Making (Complexity):  Clinical Presentation: Evolving/Changing  Clinical Presentation Rationale: based on medical and personal factors listed in PT evaluation  Clinical Decision Making (Complexity): Moderate complexity    PLAN OF CARE  Treatment Interventions:  Interventions: Manual Therapy, Neuromuscular Re-education, Therapeutic Activity, Therapeutic Exercise    Long Term Goals     PT Goal 1  Goal Description: Minutes pt will be able to sit with 0/10 symptoms: 60  Rationale: to maximize safety and independence within the community  Goal Progress: Minutes pt can sit with 0/10 symptoms: 20-30  Target Date: 10/04/23      Frequency of Treatment: once per week  Duration of Treatment: six weeks    Education Assessment:   Learner/Method:  Patient  Education Comments: Performance of nerve glides with PTRx videos    Risks and benefits of evaluation/treatment have been explained.   Patient/Family/caregiver agrees with Plan of Care.     Evaluation Time:     PT Eval, Moderate Complexity Minutes (78809): 20       Signing Clinician: Krzysztof Saldaña PT

## 2023-08-30 ENCOUNTER — THERAPY VISIT (OUTPATIENT)
Dept: PHYSICAL THERAPY | Facility: CLINIC | Age: 38
End: 2023-08-30
Attending: PHYSICIAN ASSISTANT
Payer: COMMERCIAL

## 2023-08-30 ENCOUNTER — LAB (OUTPATIENT)
Dept: LAB | Facility: CLINIC | Age: 38
End: 2023-08-30
Payer: COMMERCIAL

## 2023-08-30 DIAGNOSIS — M54.12 CERVICAL RADICULOPATHY: Primary | ICD-10-CM

## 2023-08-30 DIAGNOSIS — Z30.2 ENCOUNTER FOR VASECTOMY: ICD-10-CM

## 2023-08-30 DIAGNOSIS — R20.2 PARESTHESIA OF UPPER EXTREMITY: ICD-10-CM

## 2023-08-30 LAB — SEMEN ANALYSIS P VAS PNL: NORMAL

## 2023-08-30 PROCEDURE — 97110 THERAPEUTIC EXERCISES: CPT | Mod: GP | Performed by: PHYSICAL THERAPIST

## 2023-08-30 PROCEDURE — 89321 SEMEN ANAL SPERM DETECTION: CPT

## 2023-08-30 PROCEDURE — 97112 NEUROMUSCULAR REEDUCATION: CPT | Mod: GP | Performed by: PHYSICAL THERAPIST

## 2023-09-20 ENCOUNTER — THERAPY VISIT (OUTPATIENT)
Dept: PHYSICAL THERAPY | Facility: CLINIC | Age: 38
End: 2023-09-20
Payer: COMMERCIAL

## 2023-09-20 DIAGNOSIS — M54.12 CERVICAL RADICULOPATHY: Primary | ICD-10-CM

## 2023-09-20 DIAGNOSIS — R20.2 PARESTHESIA OF UPPER EXTREMITY: ICD-10-CM

## 2023-09-20 PROCEDURE — 97110 THERAPEUTIC EXERCISES: CPT | Mod: GP | Performed by: PHYSICAL THERAPIST

## 2023-09-20 NOTE — PROGRESS NOTES
"   09/20/23 0500   Appointment Info   Signing clinician's name / credentials Krzysztof Saldaña PT   Total/Authorized Visits 6   Visits Used 3   Medical Diagnosis Cervical radiculopathy   PT Tx Diagnosis Upper extremity paresthesias   Progress Note/Certification   Onset of illness/injury or Date of Surgery 07/25/23  (referred to PT)   Therapy Frequency once per week   Predicted Duration six weeks   Progress Note Completed Date 08/23/23   PT Goal 1   Goal Description Minutes pt will be able to sit with 0/10 symptoms: 60   Rationale to maximize safety and independence within the community   Goal Progress No restrictions   Target Date 10/04/23   Date Met 09/20/23   Subjective Report   Subjective Report Pt notes progress.  Has been \"60%\" good on HEP.  Sleeping better and waking up more comfortably.  HEP has gotten easier.   Objective Measure 1   Details No symptoms with cervical AROM all directions.  Negative ULTT.  No distal strength asymmetry.   Treatment Interventions (PT)   Interventions Therapeutic Procedure/Exercise   Therapeutic Procedure/Exercise   Therapeutic Procedures: strength, endurance, ROM, flexibillity minutes (96901) 15   Skilled Intervention See above.   Patient Response/Progress See above.   Ther Proc 1 HEP   Ther Proc 1 - Details Review of HEP.  Given pt progressing functionally, continue with HEP in place.  OK to build strengthening component.  Can add resistance as able, such as with weights at work.  Encourage pt to stay in good posture throughout and apply the principles done in clinic.  Can also include TA contraction throughout.  Pt indicates no questions and feels comfortable with it.   Total Session Time   Timed Code Treatment Minutes 15   Total Treatment Time (sum of timed and untimed services) 15         DISCHARGE  Reason for Discharge: Patient has met all goals.    Equipment Issued: N/A    Discharge Plan: Given progress, pt agrees discharge to HEP but will let me know if there are further " issues.    Referring Provider:  [facility]

## 2023-11-26 ENCOUNTER — HEALTH MAINTENANCE LETTER (OUTPATIENT)
Age: 38
End: 2023-11-26

## 2024-06-30 SDOH — HEALTH STABILITY: PHYSICAL HEALTH: ON AVERAGE, HOW MANY MINUTES DO YOU ENGAGE IN EXERCISE AT THIS LEVEL?: 30 MIN

## 2024-06-30 SDOH — HEALTH STABILITY: PHYSICAL HEALTH: ON AVERAGE, HOW MANY DAYS PER WEEK DO YOU ENGAGE IN MODERATE TO STRENUOUS EXERCISE (LIKE A BRISK WALK)?: 4 DAYS

## 2024-06-30 ASSESSMENT — SOCIAL DETERMINANTS OF HEALTH (SDOH): HOW OFTEN DO YOU GET TOGETHER WITH FRIENDS OR RELATIVES?: THREE TIMES A WEEK

## 2024-07-01 ENCOUNTER — MYC MEDICAL ADVICE (OUTPATIENT)
Dept: FAMILY MEDICINE | Facility: CLINIC | Age: 39
End: 2024-07-01

## 2024-07-01 ENCOUNTER — OFFICE VISIT (OUTPATIENT)
Dept: FAMILY MEDICINE | Facility: CLINIC | Age: 39
End: 2024-07-01
Payer: COMMERCIAL

## 2024-07-01 VITALS
RESPIRATION RATE: 20 BRPM | HEIGHT: 74 IN | TEMPERATURE: 96.4 F | WEIGHT: 226.6 LBS | DIASTOLIC BLOOD PRESSURE: 72 MMHG | OXYGEN SATURATION: 98 % | BODY MASS INDEX: 29.08 KG/M2 | HEART RATE: 68 BPM | SYSTOLIC BLOOD PRESSURE: 110 MMHG

## 2024-07-01 DIAGNOSIS — F40.248 FEAR OF PUBLIC SPEAKING: ICD-10-CM

## 2024-07-01 DIAGNOSIS — Z13.1 SCREENING FOR DIABETES MELLITUS: ICD-10-CM

## 2024-07-01 DIAGNOSIS — M77.9 TENDONITIS: Primary | ICD-10-CM

## 2024-07-01 DIAGNOSIS — N50.82 PAIN IN SCROTUM: ICD-10-CM

## 2024-07-01 DIAGNOSIS — Z71.1 CONCERN ABOUT SKIN CANCER WITHOUT DIAGNOSIS: ICD-10-CM

## 2024-07-01 DIAGNOSIS — Z00.00 ROUTINE GENERAL MEDICAL EXAMINATION AT A HEALTH CARE FACILITY: Primary | ICD-10-CM

## 2024-07-01 DIAGNOSIS — R31.29 MICROSCOPIC HEMATURIA: Primary | ICD-10-CM

## 2024-07-01 DIAGNOSIS — R10.31 RLQ ABDOMINAL PAIN: ICD-10-CM

## 2024-07-01 DIAGNOSIS — M77.9 TENDONITIS: ICD-10-CM

## 2024-07-01 LAB
ALBUMIN SERPL BCG-MCNC: 4.6 G/DL (ref 3.5–5.2)
ALBUMIN UR-MCNC: NEGATIVE MG/DL
ALP SERPL-CCNC: 44 U/L (ref 40–150)
ALT SERPL W P-5'-P-CCNC: 11 U/L (ref 0–70)
AMYLASE SERPL-CCNC: 53 U/L (ref 28–100)
ANION GAP SERPL CALCULATED.3IONS-SCNC: 9 MMOL/L (ref 7–15)
APPEARANCE UR: CLEAR
AST SERPL W P-5'-P-CCNC: 16 U/L (ref 0–45)
BASOPHILS # BLD AUTO: 0 10E3/UL (ref 0–0.2)
BASOPHILS NFR BLD AUTO: 1 %
BILIRUB SERPL-MCNC: 0.5 MG/DL
BILIRUB UR QL STRIP: NEGATIVE
BUN SERPL-MCNC: 15.5 MG/DL (ref 6–20)
CALCIUM SERPL-MCNC: 9.6 MG/DL (ref 8.6–10)
CHLORIDE SERPL-SCNC: 104 MMOL/L (ref 98–107)
CHOLEST SERPL-MCNC: 191 MG/DL
COLOR UR AUTO: YELLOW
CREAT SERPL-MCNC: 0.96 MG/DL (ref 0.67–1.17)
DEPRECATED HCO3 PLAS-SCNC: 28 MMOL/L (ref 22–29)
EGFRCR SERPLBLD CKD-EPI 2021: >90 ML/MIN/1.73M2
EOSINOPHIL # BLD AUTO: 0.3 10E3/UL (ref 0–0.7)
EOSINOPHIL NFR BLD AUTO: 7 %
ERYTHROCYTE [DISTWIDTH] IN BLOOD BY AUTOMATED COUNT: 12.8 % (ref 10–15)
FASTING STATUS PATIENT QL REPORTED: YES
FASTING STATUS PATIENT QL REPORTED: YES
GLUCOSE SERPL-MCNC: 104 MG/DL (ref 70–99)
GLUCOSE UR STRIP-MCNC: NEGATIVE MG/DL
HBA1C MFR BLD: 5.2 % (ref 0–5.6)
HCT VFR BLD AUTO: 45.2 % (ref 40–53)
HDLC SERPL-MCNC: 54 MG/DL
HGB BLD-MCNC: 15.3 G/DL (ref 13.3–17.7)
HGB UR QL STRIP: ABNORMAL
IMM GRANULOCYTES # BLD: 0 10E3/UL
IMM GRANULOCYTES NFR BLD: 0 %
KETONES UR STRIP-MCNC: NEGATIVE MG/DL
LDLC SERPL CALC-MCNC: 115 MG/DL
LEUKOCYTE ESTERASE UR QL STRIP: NEGATIVE
LIPASE SERPL-CCNC: 21 U/L (ref 13–60)
LYMPHOCYTES # BLD AUTO: 1.3 10E3/UL (ref 0.8–5.3)
LYMPHOCYTES NFR BLD AUTO: 30 %
MCH RBC QN AUTO: 30.7 PG (ref 26.5–33)
MCHC RBC AUTO-ENTMCNC: 33.8 G/DL (ref 31.5–36.5)
MCV RBC AUTO: 91 FL (ref 78–100)
MONOCYTES # BLD AUTO: 0.4 10E3/UL (ref 0–1.3)
MONOCYTES NFR BLD AUTO: 8 %
NEUTROPHILS # BLD AUTO: 2.4 10E3/UL (ref 1.6–8.3)
NEUTROPHILS NFR BLD AUTO: 54 %
NITRATE UR QL: NEGATIVE
NONHDLC SERPL-MCNC: 137 MG/DL
PH UR STRIP: 6 [PH] (ref 5–7)
PLATELET # BLD AUTO: 176 10E3/UL (ref 150–450)
POTASSIUM SERPL-SCNC: 5.5 MMOL/L (ref 3.4–5.3)
PROT SERPL-MCNC: 7.4 G/DL (ref 6.4–8.3)
RBC # BLD AUTO: 4.99 10E6/UL (ref 4.4–5.9)
RBC #/AREA URNS AUTO: NORMAL /HPF
SODIUM SERPL-SCNC: 141 MMOL/L (ref 135–145)
SP GR UR STRIP: >=1.03 (ref 1–1.03)
TRIGL SERPL-MCNC: 108 MG/DL
UROBILINOGEN UR STRIP-ACNC: 0.2 E.U./DL
WBC # BLD AUTO: 4.5 10E3/UL (ref 4–11)
WBC #/AREA URNS AUTO: NORMAL /HPF

## 2024-07-01 PROCEDURE — 83036 HEMOGLOBIN GLYCOSYLATED A1C: CPT | Performed by: NURSE PRACTITIONER

## 2024-07-01 PROCEDURE — 80053 COMPREHEN METABOLIC PANEL: CPT | Performed by: NURSE PRACTITIONER

## 2024-07-01 PROCEDURE — 82150 ASSAY OF AMYLASE: CPT | Performed by: NURSE PRACTITIONER

## 2024-07-01 PROCEDURE — 90471 IMMUNIZATION ADMIN: CPT | Performed by: NURSE PRACTITIONER

## 2024-07-01 PROCEDURE — 83690 ASSAY OF LIPASE: CPT | Performed by: NURSE PRACTITIONER

## 2024-07-01 PROCEDURE — 99395 PREV VISIT EST AGE 18-39: CPT | Mod: 25 | Performed by: NURSE PRACTITIONER

## 2024-07-01 PROCEDURE — 85025 COMPLETE CBC W/AUTO DIFF WBC: CPT | Performed by: NURSE PRACTITIONER

## 2024-07-01 PROCEDURE — 90715 TDAP VACCINE 7 YRS/> IM: CPT | Performed by: NURSE PRACTITIONER

## 2024-07-01 PROCEDURE — 36415 COLL VENOUS BLD VENIPUNCTURE: CPT | Performed by: NURSE PRACTITIONER

## 2024-07-01 PROCEDURE — 81001 URINALYSIS AUTO W/SCOPE: CPT | Performed by: NURSE PRACTITIONER

## 2024-07-01 PROCEDURE — 80061 LIPID PANEL: CPT | Performed by: NURSE PRACTITIONER

## 2024-07-01 PROCEDURE — 99214 OFFICE O/P EST MOD 30 MIN: CPT | Mod: 25 | Performed by: NURSE PRACTITIONER

## 2024-07-01 RX ORDER — PROPRANOLOL HYDROCHLORIDE 10 MG/1
TABLET ORAL
Qty: 40 TABLET | Refills: 1 | Status: SHIPPED | OUTPATIENT
Start: 2024-07-01

## 2024-07-01 RX ORDER — METHYLPREDNISOLONE 4 MG
TABLET, DOSE PACK ORAL
Qty: 21 TABLET | Refills: 0 | Status: SHIPPED | OUTPATIENT
Start: 2024-07-01

## 2024-07-01 ASSESSMENT — PAIN SCALES - GENERAL: PAINLEVEL: NO PAIN (0)

## 2024-07-01 NOTE — PATIENT INSTRUCTIONS
"You can call imaging scheduling to set up appointment date, time, and location that works best for you to have ultrasound of your abdomen and scrotum [phone]    Please take the Medrol Dosepak as directed.  Try and rest your right arm for the next month or so.  If the pain does not completely resolve or returns please let me know and you may need to see orthopedics.        Patient Education   Preventive Care Advice   This is general advice we often give to help people stay healthy. Your care team may have specific advice just for you. Please talk to your care team about your own preventive care needs.  Lifestyle  Exercise at least 150 minutes each week (30 minutes a day, 5 days a week).  Do muscle strengthening activities 2 days a week. These help control your weight and prevent disease.  No smoking.  Wear sunscreen to prevent skin cancer.  Have your home tested for radon every 2 to 5 years. Radon is a colorless, odorless gas that can harm your lungs. To learn more, go to www.health.[facility].[place].us and search for \"Radon in Homes.\"  Keep guns unloaded and locked up in a safe place like a safe or gun vault, or, use a gun lock and hide the keys. Always lock away bullets separately. To learn more, visit [employer].[place].gov and search for \"safe gun storage.\"  Nutrition  Eat 5 or more servings of fruits and vegetables each day.  Try wheat bread, brown rice and whole grain pasta (instead of white bread, rice, and pasta).  Get enough calcium and vitamin D. Check the label on foods and aim for 100% of the RDA (recommended daily allowance).  Regular exams  Have a dental exam and cleaning every 6 months.  See your health care team every year to talk about:  Any changes in your health.  Any medicines your care team has prescribed.  Preventive care, family planning, and ways to prevent chronic diseases.  Shots (vaccines)   HPV shots (up to age 26), if you've never had them before.  Hepatitis B shots (up to age 59), if you've never had " them before.  COVID-19 shot: Get this shot when it's due.  Flu shot: Get a flu shot every year.  Tetanus shot: Get a tetanus shot every 10 years.  Pneumococcal, hepatitis A, and RSV shots: Ask your care team if you need these based on your risk.  Shingles shot (for age 50 and up).  General health tests  Diabetes screening:  Starting at age 35, Get screened for diabetes at least every 3 years.  If you are younger than age 35, ask your care team if you should be screened for diabetes.  Cholesterol test: At age 39, start having a cholesterol test every 5 years, or more often if advised.  Bone density scan (DEXA): At age 50, ask your care team if you should have this scan for osteoporosis (brittle bones).  Hepatitis C: Get tested at least once in your life.  Abdominal aortic aneurysm screening: Talk to your doctor about having this screening if you:  Have ever smoked; and  Are biologically male; and  Are between the ages of 65 and 75.  STIs (sexually transmitted infections)  Before age 24: Ask your care team if you should be screened for STIs.  After age 24: Get screened for STIs if you're at risk. You are at risk for STIs (including HIV) if:  You are sexually active with more than one person.  You don't use condoms every time.  You or a partner was diagnosed with a sexually transmitted infection.  If you are at risk for HIV, ask about PrEP medicine to prevent HIV.  Get tested for HIV at least once in your life, whether you are at risk for HIV or not.  Cancer screening tests  Cervical cancer screening: If you have a cervix, begin getting regular cervical cancer screening tests at age 21. Most people who have regular screenings with normal results can stop after age 65. Talk about this with your provider.  Breast cancer scan (mammogram): If you've ever had breasts, begin having regular mammograms starting at age 40. This is a scan to check for breast cancer.  Colon cancer screening: It is important to start screening for  "colon cancer at age 45.  Have a colonoscopy test every 10 years (or more often if you're at risk) Or, ask your provider about stool tests like a FIT test every year or Cologuard test every 3 years.  To learn more about your testing options, visit: www.[employer]/262341.pdf.  For help making a decision, visit: judy/yu14866.  Prostate cancer screening test: If you have a prostate and are age 55 to 69, ask your provider if you would benefit from a yearly prostate cancer screening test.  Lung cancer screening: If you are a current or former smoker age 50 to 80, ask your care team if ongoing lung cancer screenings are right for you.  For informational purposes only. Not to replace the advice of your health care provider. Copyright   2023 [facility]. All rights reserved. Clinically reviewed by the [facility] Transitions Program. [employer] 289094 - REV 04/24.  9 Ways to Cut Back on Drinking  Maybe you've found yourself drinking more alcohol than you'd prefer. If you want to cut back, here are some ideas to try.    Think before you drink.  Do you really want a drink, or is it just a habit? If you're used to having a drink at a certain time, try doing something else then.     Look for substitutes.  Find some no-alcohol drinks that you enjoy, like flavored seltzer water, tea with honey, or tonic with a slice of lime. Or try alcohol-free beer or \"virgin\" cocktails (without the alcohol).     Drink more water.  Use water to quench your thirst. Drink a glass of water before you have any alcohol. Have another glass along with every drink or between drinks.     Shrink your drink.  For example, have a bottle of beer instead of a pint. Use a smaller glass for wine. Choose drinks with lower alcohol content (ABV%). Or use less liquor and more mixer in cocktails.     Slow down.  It's easy to drink quickly and without thinking about it. Pay attention, and make each drink last longer.     Do the math.  Total up " "how much you spend on alcohol each month. How much is that a year? If you cut back, what could you do with the money you save?     Take a break.  Choose a day or two each week when you won't drink at all. Notice how you feel on those days, physically and emotionally. How did you sleep? Do you feel better? Over time, add more break days.     Count calories.  Would you like to lose some weight? For some people that's a good motivator for cutting back. Figure out how many calories are in each drink. How many does that add up to in a day? In a week? In a month?     Practice saying no.  Be ready when someone offers you a drink. Try: \"Thanks, I've had enough.\" Or \"Thanks, but I'm cutting back.\" Or \"No, thanks. I feel better when I drink less.\"   Current as of: November 15, 2023               Content Version: 14.0    6464-9409 [employer].   Care instructions adapted under license by your healthcare professional. If you have questions about a medical condition or this instruction, always ask your healthcare professional. [employer] disclaims any warranty or liability for your use of this information.    Substance Use Disorder: Care Instructions  Overview     You can improve your life and health by stopping your use of alcohol or drugs. When you don't drink or use drugs, you may feel and sleep better. You may get along better with your family, friends, and coworkers. There are medicines and programs that can help with substance use disorder.  How can you care for yourself at home?  Here are some ways to help you stay sober and prevent relapse.  If you have been given medicine to help keep you sober or reduce your cravings, be sure to take it exactly as prescribed.  Talk to your doctor about programs that can help you stop using drugs or drinking alcohol.  Do not keep alcohol or drugs in your home.  Plan ahead. Think about what you'll say if other people ask you to drink or use drugs. Try not to " spend time with people who drink or use drugs.  Use the time and money spent on drinking or drugs to do something that's important to you.  Preventing a relapse  Have a plan to deal with relapse. Learn to recognize changes in your thinking that lead you to drink or use drugs. Get help before you start to drink or use drugs again.  Try to stay away from situations, friends, or places that may lead you to drink or use drugs.  If you feel the need to drink alcohol or use drugs again, seek help right away. Call a trusted friend or family member. Some people get support from organizations such as Narcotics Anonymous or [employer] or from treatment facilities.  If you relapse, get help as soon as you can. Some people make a plan with another person that outlines what they want that person to do for them if they relapse. The plan usually includes how to handle the relapse and who to notify in case of relapse.  Don't give up. Remember that a relapse doesn't mean that you have failed. Use the experience to learn the triggers that lead you to drink or use drugs. Then quit again. Recovery is a lifelong process. Many people have several relapses before they are able to quit for good.  Follow-up care is a key part of your treatment and safety. Be sure to make and go to all appointments, and call your doctor if you are having problems. It's also a good idea to know your test results and keep a list of the medicines you take.  When should you call for help?   Call 911  anytime you think you may need emergency care. For example, call if you or someone else:    Has overdosed or has withdrawal signs. Be sure to tell the emergency workers that you are or someone else is using or trying to quit using drugs. Overdose or withdrawal signs may include:  Losing consciousness.  Seizure.  Seeing or hearing things that aren't there (hallucinations).     Is thinking or talking about suicide or harming others.   Where to get help 24 hours a  "day, 7 days a week   If you or someone you know talks about suicide, self-harm, a mental health crisis, a substance use crisis, or any other kind of emotional distress, get help right away. You can:    Call the Suicide and Crisis Lifeline at 988.     Call 1-938-378-TALK (1-416.838.4225).     Text HOME to 246421 to access the Crisis Text Line.   Consider saving these numbers in your phone.  Go to [employer] for more information or to chat online.  Call your doctor now or seek immediate medical care if:    You are having withdrawal symptoms. These may include nausea or vomiting, sweating, shakiness, and anxiety.   Watch closely for changes in your health, and be sure to contact your doctor if:    You have a relapse.     You need more help or support to stop.   Where can you learn more?  Go to https://www.[employer].net/patiented  Enter H573 in the search box to learn more about \"Substance Use Disorder: Care Instructions.\"  Current as of: November 15, 2023               Content Version: 14.0    5487-6856 [employer].   Care instructions adapted under license by your healthcare professional. If you have questions about a medical condition or this instruction, always ask your healthcare professional. [employer] disclaims any warranty or liability for your use of this information.         "

## 2024-07-01 NOTE — PROGRESS NOTES
"Preventive Care Visit  [facility] WENDIE Alberto CNP, Family Medicine  Jul 1, 2024      Assessment & Plan     Fear of public speaking  Current dose working well.  May take 1 to 2 tablets as needed.  Refill given.  Follow-up in 1 year  - propranolol (INDERAL) 10 MG tablet; TAKE 1 TABLET BY MOUTH EVERY 15-20 MINUTES PRIOR TO SPEAKING EVENT    RLQ abdominal pain  Will check labs here today.  Obtain ultrasound.  Full plan will depend on outcome  - US Abdomen Complete; Future  - Comprehensive metabolic panel; Future  - CBC with Platelets & Differential; Future  - Amylase; Future  - Lipase; Future  - Lipase  - Amylase  - CBC with Platelets & Differential  - Comprehensive metabolic panel    Routine general medical examination at a health care facility  Screening guidelines reviewed.   - Lipid panel reflex to direct LDL Fasting; Future  - Lipid panel reflex to direct LDL Fasting    Pain in scrotum  - US Testicular & Scrotum w Doppler Ltd; Future  - CBC with Platelets & Differential; Future  - UA Macroscopic with reflex to Microscopic and Culture; Future  - UA Macroscopic with reflex to Microscopic and Culture  - CBC with Platelets & Differential  - UA Microscopic with Reflex to Culture    Screening for diabetes mellitus  - Comprehensive metabolic panel; Future  - Hemoglobin A1c; Future  - Hemoglobin A1c  - Comprehensive metabolic panel    Concern about skin cancer without diagnosis  - Adult Dermatology  Referral; Future    Tendonitis  Prescription for Medrol Dosepak.  Educated on use and possible side effects.  Notify if no improvement and may need referral to orthopedics.  - methylPREDNISolone (MEDROL DOSEPAK) 4 MG tablet therapy pack; Follow Package Directions          BMI  Estimated body mass index is 28.77 kg/m  as calculated from the following:    Height as of this encounter: 1.89 m (6' 2.41\").    Weight as of this encounter: 102.8 kg (226 lb 9.6 oz).     Counseling  Appropriate " preventive services were discussed with this patient, including applicable screening as appropriate for fall prevention, nutrition, physical activity, Tobacco-use cessation, weight loss and cognition.  Checklist reviewing preventive services available has been given to the patient.  Reviewed patient's diet, addressing concerns and/or questions.   The patient reports drinking more than 3 alcoholic drinks per day and/or more than 7 drhnks per week. The patient was counseled and given information about possible harmful effects of excessive alcohol intake.      Ac Larson is a 39 year old, presenting for the following:  Physical        7/1/2024     7:19 AM   Additional Questions   Roomed by Coty BURRIS         7/1/2024     7:19 AM   Patient Reported Additional Medications   Patient reports taking the following new medications NONE        HPI        6/30/2024   General Health   How would you rate your overall physical health? Good   Feel stress (tense, anxious, or unable to sleep) To some extent      (!) STRESS CONCERN      6/30/2024   Nutrition   Three or more servings of calcium each day? Yes   Diet: Regular (no restrictions)   How many servings of fruit and vegetables per day? (!) 0-1   How many sweetened beverages each day? 0-1            6/30/2024   Exercise   Days per week of moderate/strenous exercise 4 days   Average minutes spent exercising at this level 30 min            6/30/2024   Social Factors   Frequency of gathering with friends or relatives Three times a week   Worry food won't last until get money to buy more No   Food not last or not have enough money for food? No   Do you have housing? (Housing is defined as stable permanent housing and does not include staying ouside in a car, in a tent, in an abandoned building, in an overnight shelter, or couch-surfing.) Yes   Are you worried about losing your housing? No   Lack of transportation? No   Unable to get utilities (heat,electricity)? No             2024   Dental   Dentist two times every year? Yes            2024   TB Screening   Were you born outside of the [place]? Yes            Today's PHQ-2 Score:       2024     2:21 PM   PHQ-2 (  [employer])   Q1: Little interest or pleasure in doing things 0   Q2: Feeling down, depressed or hopeless 1   PHQ-2 Score 1   Q1: Little interest or pleasure in doing things Not at all   Q2: Feeling down, depressed or hopeless Several days   PHQ-2 Score 1           2024   Substance Use   Alcohol more than 3/day or more than 7/wk Yes   How often do you have a drink containing alcohol 2 to 3 times a week   How many alcohol drinks on typical day 5 or 6   How often do you have 5+ drinks at one occasion Weekly   Audit 2/3 Score 5   How often not able to stop drinking once started Less than monthly   How often failed to do what normally expected Never   How often needed first drink in am after a heavy drinking session Never   How often feeling of guilt or remorse after drinking Less than monthly   How often unable to remember what happened the night before Less than monthly   Have you or someone else been injured because of your drinking No   Has anyone been concerned or suggested you cut down on drinking Yes, but not in the last year   TOTAL SCORE - AUDIT 13   Do you use any other substances recreationally? (!) ALCOHOL        Social History     Tobacco Use    Smoking status: Former     Current packs/day: 0.00     Average packs/day: 1 pack/day for 15.0 years (15.0 ttl pk-yrs)     Types: Cigarettes     Start date: 2004     Quit date: 2019     Years since quittin.5     Passive exposure: Past    Smokeless tobacco: Former     Quit date: 2019   Vaping Use    Vaping status: Never Used   Substance Use Topics    Alcohol use: Yes    Drug use: No           2024   STI Screening   New sexual partner(s) since last STI/HIV test? No           Reviewed and updated as needed this visit by Provider                       Here today for physical.  Is fasting for labs.  Continues to take propranolol prior to presentations and interviews.  Feels like it is working well.  Has had a couple of times where could still feel some palpitations when was speaking otherwise overall is working very well to help control symptoms.    Has a couple of moles to face.  Was given referral to dermatology after last appointment and was not able to schedule.  These areas have remained unchanged since last visit but would like to get them looked at.    RLQ discomfort.  Has been present since had vasectomy.  No fevers or chills.  No constipation or diarrhea.  Had vasectomy in November.  Continues to have pain to scrotum as well.  Is uncomfortable all the time, all day long.  Discomfort does not usually get worse.  Does not notice any swelling to scrotum.  Sadler is not painful.  Scrotum does feel different but is not sure if it is normal after having a vasectomy.    Has a pin to right elbow. Is not able to  kids, kneeding meat, shaving. Will get better and worse. Put dock in himself this spring. Has taken tylenol or ibuprofen and that does help some. Strength is less. Is right handed. No swelling. Does sit with elbows propped a lot. That will cause to have some numbness but then gets better if canges positions.     Last PSA: Never, no family history  Last Colonoscopy: Never, no family history  Last eye exam: Long time ago.   Last dental exam: Every 6 mo  Last tetanus vaccine: 2013  Last influenza vaccine: 10/23  Last shingles vaccine: Not applicable  Last pneumonia vaccine: Not applicable  Last RSV vaccine: N/A  Last COVID vaccine: done   Last COVID booster: 10/23  Hep C screen: 2022  HIV screen: has been in the past, declines re screening.   AAA screen (age 65-75 with smoking hx): Not applicable  IVD (HTN, Hyperlipid, Smoking): Not applicable not applicable  Lung CA screening (50-77, 20 pk smoking hx) Quit within 15 years: Not applicable    "     Objective    Exam  /72   Pulse 68   Temp (!) 96.4  F (35.8  C) (Temporal)   Resp 20   Ht 1.89 m (6' 2.41\")   Wt 102.8 kg (226 lb 9.6 oz)   SpO2 98%   BMI 28.77 kg/m     Estimated body mass index is 28.77 kg/m  as calculated from the following:    Height as of this encounter: 1.89 m (6' 2.41\").    Weight as of this encounter: 102.8 kg (226 lb 9.6 oz).    Physical Exam  Abdominal:      Tenderness: There is abdominal tenderness in the right lower quadrant. There is guarding. There is no rebound.   Musculoskeletal:      Right elbow: No swelling, deformity or effusion. Decreased range of motion. Tenderness present.   Skin:     Comments: 1 papule to right face and left neck.  Skin changes noted due to sun exposure.           Signed Electronically by: WENDIE Emery CNP    "

## 2024-07-02 DIAGNOSIS — E87.5 HYPERKALEMIA: Primary | ICD-10-CM

## 2024-07-02 NOTE — TELEPHONE ENCOUNTER
Shree,     Your cholesterol has improved since last year.  Keep up the good work!  Should plan to check again in 1 year.     Your potassium level is just a bit elevated.  If you are taking any over-the-counter supplements with potassium in them please stop.  Please decrease your intake of high potassium foods like bananas, potatoes, spinach, broccoli, asparagus, brussel sprouts, tomatoes and avocados.  Should plan to recheck your potassium again in 2 weeks.  I have placed that lab order.  You can make a lab only appointment for a date and time in about 2 weeks that works best for you.     Your kidney function is normal.  Your blood sugar is just a bit elevated.  Will plan to recheck again in 1 year  Your liver function is normal.  Your pancreatic enzymes are in normal range.     Liz ESQUIVEL   Written by WENDIE Fine CNP on 7/2/2024  6:43 AM CDT  Seen by patient Neo Padgett on 7/2/2024  7:28 AM      Shree,     Your urine sample does have a small amount of blood in it.  I would recommend that we recheck this in 2 to 3 months.  I have placed that order.  You can make a lab only appointment for a date and time in a few months that works best for you.     Your blood counts are in normal range.     Your hemoglobin A1c, 3-month average of your blood sugar, is in normal range.     Liz ESQUIVEL   Written by WENDIE Fine CNP on 7/1/2024 12:27 PM CDT  Seen by patient Neo Padgett on 7/2/2024  7:28 AM

## 2024-07-08 ENCOUNTER — ANCILLARY PROCEDURE (OUTPATIENT)
Dept: ULTRASOUND IMAGING | Facility: CLINIC | Age: 39
End: 2024-07-08
Attending: NURSE PRACTITIONER
Payer: COMMERCIAL

## 2024-07-08 DIAGNOSIS — R16.1 SPLENOMEGALY: Primary | ICD-10-CM

## 2024-07-08 DIAGNOSIS — N50.82 PAIN IN SCROTUM: ICD-10-CM

## 2024-07-08 DIAGNOSIS — R10.31 RLQ ABDOMINAL PAIN: ICD-10-CM

## 2024-07-08 PROCEDURE — 76870 US EXAM SCROTUM: CPT | Mod: TC | Performed by: RADIOLOGY

## 2024-07-08 PROCEDURE — 76700 US EXAM ABDOM COMPLETE: CPT | Mod: TC | Performed by: RADIOLOGY

## 2024-07-08 PROCEDURE — 93976 VASCULAR STUDY: CPT | Mod: TC | Performed by: RADIOLOGY

## 2024-07-15 ENCOUNTER — LAB (OUTPATIENT)
Dept: LAB | Facility: CLINIC | Age: 39
End: 2024-07-15
Payer: COMMERCIAL

## 2024-07-15 DIAGNOSIS — R16.1 SPLENOMEGALY: ICD-10-CM

## 2024-07-15 DIAGNOSIS — E87.5 HYPERKALEMIA: ICD-10-CM

## 2024-07-15 LAB
BASOPHILS # BLD AUTO: 0 10E3/UL (ref 0–0.2)
BASOPHILS NFR BLD AUTO: 1 %
EOSINOPHIL # BLD AUTO: 0.3 10E3/UL (ref 0–0.7)
EOSINOPHIL NFR BLD AUTO: 8 %
ERYTHROCYTE [DISTWIDTH] IN BLOOD BY AUTOMATED COUNT: 12.7 % (ref 10–15)
FERRITIN SERPL-MCNC: 223 NG/ML (ref 31–409)
HCT VFR BLD AUTO: 43.4 % (ref 40–53)
HGB BLD-MCNC: 14.7 G/DL (ref 13.3–17.7)
IMM GRANULOCYTES # BLD: 0 10E3/UL
IMM GRANULOCYTES NFR BLD: 0 %
LYMPHOCYTES # BLD AUTO: 1.4 10E3/UL (ref 0.8–5.3)
LYMPHOCYTES NFR BLD AUTO: 35 %
MCH RBC QN AUTO: 30.3 PG (ref 26.5–33)
MCHC RBC AUTO-ENTMCNC: 33.9 G/DL (ref 31.5–36.5)
MCV RBC AUTO: 90 FL (ref 78–100)
MONOCYTES # BLD AUTO: 0.3 10E3/UL (ref 0–1.3)
MONOCYTES NFR BLD AUTO: 8 %
NEUTROPHILS # BLD AUTO: 2 10E3/UL (ref 1.6–8.3)
NEUTROPHILS NFR BLD AUTO: 49 %
PLATELET # BLD AUTO: 179 10E3/UL (ref 150–450)
POTASSIUM SERPL-SCNC: 4.1 MMOL/L (ref 3.4–5.3)
RBC # BLD AUTO: 4.85 10E6/UL (ref 4.4–5.9)
RETICS # AUTO: 0.07 10E6/UL (ref 0.03–0.1)
RETICS/RBC NFR AUTO: 1.3 % (ref 0.5–2)
TRANSFERRIN SERPL-MCNC: 255 MG/DL (ref 200–360)
WBC # BLD AUTO: 4 10E3/UL (ref 4–11)

## 2024-07-15 PROCEDURE — 84466 ASSAY OF TRANSFERRIN: CPT

## 2024-07-15 PROCEDURE — 36415 COLL VENOUS BLD VENIPUNCTURE: CPT

## 2024-07-15 PROCEDURE — 85025 COMPLETE CBC W/AUTO DIFF WBC: CPT

## 2024-07-15 PROCEDURE — 85060 BLOOD SMEAR INTERPRETATION: CPT | Performed by: PATHOLOGY

## 2024-07-15 PROCEDURE — 84132 ASSAY OF SERUM POTASSIUM: CPT

## 2024-07-15 PROCEDURE — 85045 AUTOMATED RETICULOCYTE COUNT: CPT

## 2024-07-15 PROCEDURE — 82728 ASSAY OF FERRITIN: CPT

## 2024-07-16 LAB
PATH REPORT.COMMENTS IMP SPEC: NORMAL
PATH REPORT.FINAL DX SPEC: NORMAL
PATH REPORT.MICROSCOPIC SPEC OTHER STN: NORMAL
PATH REPORT.MICROSCOPIC SPEC OTHER STN: NORMAL

## 2024-07-25 ENCOUNTER — OFFICE VISIT (OUTPATIENT)
Dept: DERMATOLOGY | Facility: CLINIC | Age: 39
End: 2024-07-25
Attending: NURSE PRACTITIONER
Payer: COMMERCIAL

## 2024-07-25 DIAGNOSIS — L73.9 FOLLICULITIS: ICD-10-CM

## 2024-07-25 DIAGNOSIS — L84 CALLUS OF FOOT: ICD-10-CM

## 2024-07-25 DIAGNOSIS — D22.9 MULTIPLE BENIGN NEVI: Primary | ICD-10-CM

## 2024-07-25 DIAGNOSIS — Z71.1 CONCERN ABOUT SKIN CANCER WITHOUT DIAGNOSIS: ICD-10-CM

## 2024-07-25 PROCEDURE — 99202 OFFICE O/P NEW SF 15 MIN: CPT | Mod: GC | Performed by: DERMATOLOGY

## 2024-07-25 ASSESSMENT — PAIN SCALES - GENERAL: PAINLEVEL: NO PAIN (0)

## 2024-07-25 NOTE — PROGRESS NOTES
[facility] Dermatology Note  Encounter Date: Jul 25, 2024  Office Visit     Dermatology Problem List:  # Benign Nevi; Junctional v. Compound  R preauricular, L anterior neck  #Folliculitis    ____________________________________________    Assessment & Plan:   # Benign Nevi; Junctional vs Compound, 1x R preauricular, 1x L anterior neck  - Discussed benign findings on exam. Reassuring dermoscopy pigment pattern.  - ABCD's of melanoma and signs of possible non-melanoma skin cancer were reviewed with patient   - Sun protection: Counseled SPF30+ sunscreen, UPF clothing, sun avoidance    # Folliculitis, back > face  Discussed that if patient desires he may use a BP 5% wash in the shower to help with these spots.    #Callus, b/l dorsum of 5th toe  - Discussed treatment of calluses including the use of pumice stone after showering to help with thickness. Discussed etiology and benign course of lesion, often caused from friction with shoes.    Procedures Performed:   None      Follow-up: prn for new or changing lesions    Staff: Dr. Bonnie Kaba MD  Dermatology Resident, PGY2      ____________________________________________    CC: Skin Check (Neo is here for a skin check and has spots of concern on his R cheek and L chin.)    HPI:  Mr. Shree Padgett is a(n) 39 year old male who presents today as a new patient for FBSE.    Was referred from PMD at annual visit. Has two spots of concern including R cheek and L neck. They have been there for very long time. Not changing, bleeding or itching. Maybe have raised slightly.    Also has thickenings on the top of his feet. These also don't bother him.    Patient is otherwise feeling well, without additional skin concerns.    Labs Reviewed:  N/A    Physical Exam:  Vitals: There were no vitals taken for this visit.  SKIN: Total skin excluding the undergarment areas was performed. The exam included the head/face, neck, both arms, chest, back,  abdomen, both legs, digits and/or nails.   - 4 mm domed skin colored papule with light brown pigment centrally on the R preauricular area  - 2 mm oval domed light brown skin papule on the L anterior neck  - a few brown macules and papules with regular borders on the trunk and extremities  - Pink follicular papules scattered on the neck and back, slightly less on the temporal scalp  - No other lesions of concern on areas examined.     Medications:  Current Outpatient Medications   Medication Sig Dispense Refill    methylPREDNISolone (MEDROL DOSEPAK) 4 MG tablet therapy pack Follow Package Directions 21 tablet 0    propranolol (INDERAL) 10 MG tablet TAKE 1 TABLET BY MOUTH EVERY 15-20 MINUTES PRIOR TO SPEAKING EVENT 40 tablet 1     No current facility-administered medications for this visit.      Past Medical History:   Patient Active Problem List   Diagnosis    Multiple joint pain    Fear of public speaking    Paresthesia of upper extremity    Cervical radiculopathy     No past medical history on file.    CC WENDIE Fine CNP  [address] [place] [address] on close of this encounter.

## 2024-07-25 NOTE — LETTER
7/25/2024       RE: Shree Padgett  [address] [address]     Dear Colleague,    Thank you for referring your patient, Shree Padgett, to the [facility] DERMATOLOGY CLINIC [place] at [facility]. Please see a copy of my visit note below.    [facility] Dermatology Note  Encounter Date: Jul 25, 2024  Office Visit     Dermatology Problem List:  # Benign Nevi; Junctional v. Compound  R preauricular, L anterior neck  #Folliculitis    ____________________________________________    Assessment & Plan:   # Benign Nevi; Junctional vs Compound, 1x R preauricular, 1x L anterior neck  - Discussed benign findings on exam. Reassuring dermoscopy pigment pattern.  - ABCD's of melanoma and signs of possible non-melanoma skin cancer were reviewed with patient   - Sun protection: Counseled SPF30+ sunscreen, UPF clothing, sun avoidance    # Folliculitis, back > face  Discussed that if patient desires he may use a BP 5% wash in the shower to help with these spots.    #Callus, b/l dorsum of 5th toe  - Discussed treatment of calluses including the use of pumice stone after showering to help with thickness. Discussed etiology and benign course of lesion, often caused from friction with shoes.    Procedures Performed:   None      Follow-up: prn for new or changing lesions    Staff: Dr. Bonnie Kaba MD  Dermatology Resident, PGY2      ____________________________________________    CC: Skin Check (Neo is here for a skin check and has spots of concern on his R cheek and L chin.)    HPI:  Mr. Shree Padgett is a(n) 39 year old male who presents today as a new patient for FBSE.    Was referred from PMD at annual visit. Has two spots of concern including R cheek and L neck. They have been there for very long time. Not changing, bleeding or itching. Maybe have raised slightly.    Also has thickenings on the top of his feet. These also  don't bother him.    Patient is otherwise feeling well, without additional skin concerns.    Labs Reviewed:  N/A    Physical Exam:  Vitals: There were no vitals taken for this visit.  SKIN: Total skin excluding the undergarment areas was performed. The exam included the head/face, neck, both arms, chest, back, abdomen, both legs, digits and/or nails.   - 4 mm domed skin colored papule with light brown pigment centrally on the R preauricular area  - 2 mm oval domed light brown skin papule on the L anterior neck  - a few brown macules and papules with regular borders on the trunk and extremities  - Pink follicular papules scattered on the neck and back, slightly less on the temporal scalp  - No other lesions of concern on areas examined.     Medications:  Current Outpatient Medications   Medication Sig Dispense Refill     methylPREDNISolone (MEDROL DOSEPAK) 4 MG tablet therapy pack Follow Package Directions 21 tablet 0     propranolol (INDERAL) 10 MG tablet TAKE 1 TABLET BY MOUTH EVERY 15-20 MINUTES PRIOR TO SPEAKING EVENT 40 tablet 1     No current facility-administered medications for this visit.      Past Medical History:   Patient Active Problem List   Diagnosis     Multiple joint pain     Fear of public speaking     Paresthesia of upper extremity     Cervical radiculopathy     No past medical history on file.    CC Liz Molina, WENDIE CNP  [address] [place] [address] on close of this encounter.    I talked with and examined Shree Padgett and I agree with the assessment and the plan. REED Nicole MD.      20 minutes spent with Neo including exam, assessment, and counseling.       Again, thank you for allowing me to participate in the care of your patient.      Sincerely,    Jonathan Kaba MD

## 2024-07-25 NOTE — PROGRESS NOTES
I talked with and examined Shree Padgett and I agree with the assessment and the plan. REED Nicole MD.

## 2024-07-25 NOTE — NURSING NOTE
Dermatology Rooming Note    Shree Padgett's goals for this visit include:   Chief Complaint   Patient presents with    Skin Check     Neo is here for a skin check and has spots of concern on his R cheek and L chin.     Miguel Romeo, EMT

## 2024-09-16 NOTE — PROGRESS NOTES
ASSESSMENT/PLAN:    (M77.01) Medial epicondylitis, right  (primary encounter diagnosis)  Comment: exam consistent w/ both medial and lateral epicondylitis; will send to OT; precautions given; f/up in 6-8 wks; if no better, consider injections  Plan: Occupational Therapy  Referral, diclofenac (VOLTAREN) 1 % topical gel          (M77.11) Lateral epicondylitis, right elbow  Comment: see above  Plan: Occupational Therapy  Referral, diclofenac (VOLTAREN) 1 % topical gel          Attestation:  This patient has been seen and evaluated by me, Brett Dickerson MD with the resident, Dr Winkler and the care team. I agree with the findings and plan of care as documented in this note.    Brett Dickerson MD  September 17, 2024  10:09 AM        Pt is a 39 year old male here today for:     HPI:   Right Elbow pain :   Location: Medial elbow    Duration: 6 months; after putting in his dock    Radiation: No    Numbness/ Tingling? No    Weakness? Yes    Swelling? No    Imaging? No    Treatment tried: Medrol dose pack      SH: has a 2 yr old and 4 yr old so lots of lifting at home as well     No past medical history on file.   Past Surgical History:   Procedure Laterality Date    ABDOMEN SURGERY  2015    Bellybutton Hernia    HERNIA REPAIR  2015    Bellybutton Hernia      Current Outpatient Medications   Medication Sig Dispense Refill    methylPREDNISolone (MEDROL DOSEPAK) 4 MG tablet therapy pack Follow Package Directions 21 tablet 0    propranolol (INDERAL) 10 MG tablet TAKE 1 TABLET BY MOUTH EVERY 15-20 MINUTES PRIOR TO SPEAKING EVENT 40 tablet 1      No Known Allergies   ROS:   Gen- no fevers/chills   Rheum - no morning stiffness   Derm - no rash/ redness   Neuro - no numbness, no tingling   Remainder of ROS negative.     Exam:   There were no vitals taken for this visit.       R ELBOW:   Swelling: NO   ROM: Flexion - Full/ extension - Full/ pronation - Full / supination- Full.   Strength: 5/5 w/ elbow flexion/ extension    Bony tenderness: No tenderness at medial epicondyle/ lateral epicondyle/ olecranon.   Neuro: Negative ulnar tinel test.   Maneuvers: + pain medially w/ resisted wrist pronation ; + pain laterally w/ resisted  supination/ long finger extension; No pain w/ valgus stress

## 2024-09-16 NOTE — TELEPHONE ENCOUNTER
DIAGNOSIS: (R) Elbow Tendonitis / Liz Molina, WENDIE CNP / [facility] / no images or surg     APPOINTMENT DATE: 9.17.24   NOTES STATUS DETAILS   OFFICE NOTE from referring provider Internal 7.1.24  Jesse JONES   MEDICATION LIST Internal

## 2024-09-17 ENCOUNTER — PRE VISIT (OUTPATIENT)
Dept: ORTHOPEDICS | Facility: CLINIC | Age: 39
End: 2024-09-17

## 2024-09-17 ENCOUNTER — OFFICE VISIT (OUTPATIENT)
Dept: ORTHOPEDICS | Facility: CLINIC | Age: 39
End: 2024-09-17
Attending: NURSE PRACTITIONER
Payer: COMMERCIAL

## 2024-09-17 DIAGNOSIS — M77.01 MEDIAL EPICONDYLITIS, RIGHT: Primary | ICD-10-CM

## 2024-09-17 DIAGNOSIS — M77.11 LATERAL EPICONDYLITIS, RIGHT ELBOW: ICD-10-CM

## 2024-09-17 PROCEDURE — 99213 OFFICE O/P EST LOW 20 MIN: CPT | Mod: GC | Performed by: FAMILY MEDICINE

## 2024-09-17 NOTE — LETTER
9/17/2024      RE: Shree Padgett  [address] [address]     Dear Colleague,    Thank you for referring your patient, Shree Padgett, to the [facility] SPORTS MEDICINE CLINIC [place]. Please see a copy of my visit note below.    ASSESSMENT/PLAN:    (M77.01) Medial epicondylitis, right  (primary encounter diagnosis)  Comment: exam consistent w/ both medial and lateral epicondylitis; will send to OT; precautions given; f/up in 6-8 wks; if no better, consider injections  Plan: Occupational Therapy  Referral, diclofenac (VOLTAREN) 1 % topical gel          (M77.11) Lateral epicondylitis, right elbow  Comment: see above  Plan: Occupational Therapy  Referral, diclofenac (VOLTAREN) 1 % topical gel          Attestation:  This patient has been seen and evaluated by me, Brett Dickerson MD with the resident, Dr Winkler and the care team. I agree with the findings and plan of care as documented in this note.    Brett Dickerson MD  September 17, 2024  10:09 AM        Pt is a 39 year old male here today for:     HPI:   Right Elbow pain :   Location: Medial elbow    Duration: 6 months; after putting in his dock    Radiation: No    Numbness/ Tingling? No    Weakness? Yes    Swelling? No    Imaging? No    Treatment tried: Medrol dose pack      SH: has a 2 yr old and 4 yr old so lots of lifting at home as well     No past medical history on file.   Past Surgical History:   Procedure Laterality Date     ABDOMEN SURGERY  2015    Bellybutton Hernia     HERNIA REPAIR  2015    Bellybutton Hernia      Current Outpatient Medications   Medication Sig Dispense Refill     methylPREDNISolone (MEDROL DOSEPAK) 4 MG tablet therapy pack Follow Package Directions 21 tablet 0     propranolol (INDERAL) 10 MG tablet TAKE 1 TABLET BY MOUTH EVERY 15-20 MINUTES PRIOR TO SPEAKING EVENT 40 tablet 1      No Known Allergies   ROS:   Gen- no fevers/chills   Rheum - no morning stiffness   Derm - no rash/ redness   Neuro -  no numbness, no tingling   Remainder of ROS negative.     Exam:   There were no vitals taken for this visit.       R ELBOW:   Swelling: NO   ROM: Flexion - Full/ extension - Full/ pronation - Full / supination- Full.   Strength: 5/5 w/ elbow flexion/ extension   Bony tenderness: No tenderness at medial epicondyle/ lateral epicondyle/ olecranon.   Neuro: Negative ulnar tinel test.   Maneuvers: + pain medially w/ resisted wrist pronation ; + pain laterally w/ resisted  supination/ long finger extension; No pain w/ valgus stress            Again, thank you for allowing me to participate in the care of your patient.      Sincerely,    Brett Dickerson MD

## 2024-09-17 NOTE — PATIENT INSTRUCTIONS
"Medial Epicondylitis    Golfer s Elbow Instructions    MEDIAL EPICONDYLITIS (AKA GOLFER'S ELBOW):  -pain on the inside of elbow worsened with any gripping or lifting activity  -weakness of   -aching or burning pain  -Usually there is not any numbness or tingling involved (please let us know if this is happening as it may be something else)    TREATMENT:  -Avoid aggravating activities until pain free at rest and with exercises. Then return slowly with pain being your guide. IF IT HURTS, DO NOT DO IT!  -wrist brace allows the muscles to relax in neutral position. The muscles that extend and rotate your wrist are attached at the lateral elbow.It is these tendons that are painful so the wrist brace protects them (wear brace 24hours/day until pain free then may wean out except for activities)  - \"chopat\" elbow strap relieves pressure on the tendon attachment but does squeeze muscle so may initially hurt. Often helpful once resuming activities and not using wrist brace.  -Ice 10-15 minutes after activity. (or ice cup massage 5-7 min)  -cross-friction massage (rub painful area)  -Take Aleve (220mg) 2 tabs twice daily with food for 14 days to decrease inflammation, then as needed for pain.    HOME EXERCISES:  -Perform exercises as instructed in handout:  Goal:  2 sets of 20 for each strength exercise   1-2 times per day   5 days a week   Stretch after strengthening- Hold stretches for 30 secs, repeat 2-3 times   Avoid stretching through pain  -An excellent therapy program for epicondylitis is called \"Reverse Evangelist Twist\"  - you may do this through formal therapy or on your own.  -To do at home, purchase a flex  bar by [employer] online. there are different strengths so adjust according to your fitness level and pain. The bar may be purchased online as well at sites such as amaz[employer].  - Search online for \"Reverse Evangelist twist elbow exercises\" and you will find videos on how to perform.  -Follow-up in 6 weeks or sooner " if not improved, unable to do exercises do to pain, or if further concern.  -If not improving, we need to make sure the diagnosis is correct. Occasionally, elbow pain is due to nerve irritation.    -Once diagnosis is confirmed, we may consider nitroglycerin patch (see below for info), formal occupational therapy if not started, lidocaine injection to stimulate healing response (tenotomy), or other therapy modalities.  -We will try to avoid cortisone injections as they may delay healing but will consider if pain prevents improvement with other modalities  -rarely do you need surgery to alleviate pain    Other treatment options include:  -consider Active release therapy with a chiropractor.  For more information on ART check [email or web address].  -Therapeutic massage    If problem flares again after resolved, restart brace full-time, restart icing, Aleve, and exercises. If it does not calm down, schedule follow up evaluation.    You may do the stretching exercises right away. You may do the strengthening exercises when stretching is nearly painless.    STRETCHING EXERCISES  Wrist active range of motion, flexion and extension: Bend the wrist of your injured arm forward and back as far as you can. Do 2 sets of 15.    Wrist stretch: Press the back of the hand on your injured side with your other hand to help bend your wrist. Hold for 15 to 30 seconds. Next, stretch the hand back by pressing the fingers in a backward direction. Hold for 15 to 30 seconds. Keep the arm on your injured side straight during this exercise. Do 3 sets.    Forearm pronation and supination: Bend the elbow of your injured arm 90 degrees, keeping your elbow at your side. Turn your palm up and hold for 5 seconds. Then slowly turn your palm down and hold for 5 seconds. Make sure you keep your elbow at your side and bent 90 degrees while you do the exercise. Do 2 sets of 15.    STRENGTHENING EXERCISES  Eccentric wrist flexion: Hold a can or hammer  handle in the hand of your injured side with your palm up. Use the hand on the side that is not injured to bend your wrist up. Then let go of your wrist and use just your injured side to lower the weight slowly back to the starting position. Do 3 sets of 15. Gradually increase the weight you are holding.    Eccentric wrist extension: Hold a soup can or hammer handle in the hand of your injured side with your palm facing down. Use the hand on the side that is not injured to bend your wrist up. Then let go of your wrist and use just your injured side to lower the weight slowly back to the starting position. Do 3 sets of 15. Gradually increase the weight you are holding.     strengthening: Squeeze a soft rubber ball and hold the squeeze for 5 seconds. Do 2 sets of 15.    Forearm pronation and supination strengthening: Hold a soup can or hammer handle in your hand and bend your elbow 90 degrees. Slowly turn your hand so your palm is up and then down. Do 2 sets of 15.    Resisted elbow flexion and extension: Hold a can of soup with your palm up. Slowly bend your elbow so that your hand is coming toward your shoulder. Then lower it slowly so your arm is completely straight. Do 2 sets of 15. Slowly increase the weight you are using.      Developed by [employer].  Published by [employer].  Copyright  2014 [employer] and/or one of its subsidiaries. All rights reserved.    References

## 2024-10-28 ENCOUNTER — THERAPY VISIT (OUTPATIENT)
Dept: OCCUPATIONAL THERAPY | Facility: CLINIC | Age: 39
End: 2024-10-28
Attending: FAMILY MEDICINE
Payer: COMMERCIAL

## 2024-10-28 DIAGNOSIS — M77.01 MEDIAL EPICONDYLITIS, RIGHT: ICD-10-CM

## 2024-10-28 DIAGNOSIS — M25.521 RIGHT ELBOW PAIN: Primary | ICD-10-CM

## 2024-10-28 DIAGNOSIS — M77.11 LATERAL EPICONDYLITIS, RIGHT ELBOW: ICD-10-CM

## 2024-10-28 PROCEDURE — 97535 SELF CARE MNGMENT TRAINING: CPT | Mod: GO | Performed by: OCCUPATIONAL THERAPIST

## 2024-10-28 PROCEDURE — 97165 OT EVAL LOW COMPLEX 30 MIN: CPT | Mod: GO | Performed by: OCCUPATIONAL THERAPIST

## 2024-10-28 PROCEDURE — 97110 THERAPEUTIC EXERCISES: CPT | Mod: GO | Performed by: OCCUPATIONAL THERAPIST

## 2024-10-28 NOTE — PROGRESS NOTES
OCCUPATIONAL THERAPY EVALUATION  Type of Visit: Evaluation        Fall Risk Screen:  Fall screen completed by: OT  Have you fallen 2 or more times in the past year?: No  Have you fallen and had an injury in the past year?: No  Is patient a fall risk?: No    Subjective      Presenting condition or subjective complaint: (Patient-Rptd) elbow pain, tendonitis tennis and golf elbow  Date of onset: 09/17/24 (therapy referral)    Relevant medical history:   none per pt report  Dates & types of surgery: (Patient-Rptd) unrelated umbillical hernia    Prior diagnostic imaging/testing results:     none  Prior therapy history for the same diagnosis, illness or injury: (Patient-Rptd) No      Prior Level of Function  ADL: Independent    Living Environment  Social support: (Patient-Rptd) With family members   Type of home: (Patient-Rptd) House; 2-story; Basement   Stairs to enter the home: (Patient-Rptd) Yes (Patient-Rptd) 20 Is there a railing: (Patient-Rptd) Yes     Ramp: (Patient-Rptd) No   Stairs inside the home: (Patient-Rptd) Yes (Patient-Rptd) 20 Is there a railing: (Patient-Rptd) Yes     Help at home: (Patient-Rptd) None  Equipment owned:       Employment: (Patient-Rptd) Yes (Patient-Rptd) sales  Hobbies/Interests: (Patient-Rptd) hiking hunting fishing camping volleyball pickleball biking running    Patient goals for therapy: (Patient-Rptd) get rid of pain, sports    Pain assessment: Pain present  See objective evaluation for additional pain details     Objective   ADDITIONAL HISTORY:  Right hand dominant  Patient reports symptoms of pain and weakness/loss of strength in the right elbow since May after putting in docks  Transportation: drives  Currently working in normal job without restrictions    Functional Outcome Measure:   Functional Outcome Measure:  Upper Extremity Functional Index  SCORE:   Column Totals: 70/80: (Patient-Rptd)  (A lower score indicates greater disability.)    Pain Level (Scale 0-10)   10/28/2024  Patient is called to convey results per . Patient verbalizes understanding and denies questions.     At Rest 0   With Use 3     Pain Description  Date 10/28/2024   Location elbow and forearm   Pain Quality Dull and Tender   Frequency intermittent     Pain is worst  daytime   Exacerbated by  Carrying, racket sports, lifting children, weight lifting, throw a ball   Relieved by Topical NSAIDs   Progression Somewhat better     Posture  Slightly Forward Neck Posture and Rounded Forward Shoulders    Sensation  WNL throughout all nerve distributions; per patient report    ROM  WNL's elbow, forearm and wrist, pain with wrist ext and flex    Strength   (Measured in pounds)  Pain Report: - none  + mild    ++ moderate    +++ severe    10/28/2024 10/28/2024   Trials Left Right   1  2  3 108  123  124 123+  118+  118+   Average 118 120       10/28/2024 10/28/2024    Left Right   Elbow Ext 134 108++     Resisted Testing  Pain Report:  - none    + mild    ++ moderate    +++ severe    10/28/2024   Elbow Extension -   Elbow Flexion -   Supination  -   Pronation -   Wrist Ext with RD, Elbow Ext -   Wrist Ext with UD, Elbow Ext -   Wrist Flex with RD, Elbow Ext + slight   Wrist Flex with UD, Elbow Ext -   EDC with Elbow Ext + slight   Long Finger Test + slight   FDS -     Palpation  Pain Report:  - none    + mild    ++ moderate    +++ severe    10/28/2024   Pec Major -   Proximal Triceps -   Spiral Groove -   Distal Triceps -   Anconeus -   ECRB at LEP -   ECU at LEP -   EDC at LEP + slight   Radial Head -   Extensor Wad -   PIN Site -      10/28/2024   Bicep Muscle -   Distal Bicep Tendon -   Cubital Tunnel  + slight   MEP -   Flexor Origin +   Flexor Wad -   Pronator Teres -     Assessment & Plan   CLINICAL IMPRESSIONS  Medical Diagnosis: R MEP and LEP    Treatment Diagnosis: R elbow pain    Impression/Assessment: Pt is a 39 year old male presenting to Occupational Therapy due to right elbow pain.  Patient's limitations or Problem List includes: Pain, Decreased , and Tightness in musculature of the right elbow which  interferes with the patient's ability to perform Self Care Tasks (bathing), Work Tasks, Recreational Activities, and Household Chores as compared to previous level of function.    Clinical Decision Making (Complexity):  Assessment of Occupational Performance: 5 or more Performance Deficits  Occupational Performance Limitations: bathing/showering, home establishment and management, work, and leisure activities  Clinical Decision Making (Complexity): Low complexity    PLAN OF CARE  Treatment Interventions:  Modalities:  US  Therapeutic Exercise:  AROM, PROM, Isotonics, and Isometrics  Neuromuscular re-education:  Posture and Kinesiotaping  Manual Techniques:  Friction massage and Myofascial release  Orthotic Fabrication:  Static Forearm based  Self Care:  Self Care Tasks and Ergonomic Considerations    Long Term Goals   OT Goal 1  Goal Identifier: lifting/carrying  Goal Description: Pt will report no pain with lifting and carrying children  Rationale: In order to maximize safety and independence with ADL/IADLs  Goal Progress: pain 3/10  Target Date: 12/26/24      Frequency of Treatment: 2 x per month  Duration of Treatment: 2 months     Recommended Referrals to Other Professionals:  NA  Education Assessment: Learner/Method: Patient;Demonstration;Pictures/Video  Education Comments: PTRx on phone     Risks and benefits of evaluation/treatment have been explained.   Patient/Family/caregiver agrees with Plan of Care.     Evaluation Time:    OT Eval, Low Complexity Minutes (23392): 20   Present: Not applicable     Signing Clinician: Diamante Mccarty OT

## 2024-11-13 ENCOUNTER — THERAPY VISIT (OUTPATIENT)
Dept: OCCUPATIONAL THERAPY | Facility: CLINIC | Age: 39
End: 2024-11-13
Attending: FAMILY MEDICINE
Payer: COMMERCIAL

## 2024-11-13 DIAGNOSIS — M77.11 LATERAL EPICONDYLITIS, RIGHT ELBOW: ICD-10-CM

## 2024-11-13 DIAGNOSIS — M25.521 RIGHT ELBOW PAIN: ICD-10-CM

## 2024-11-13 DIAGNOSIS — M77.01 MEDIAL EPICONDYLITIS, RIGHT: Primary | ICD-10-CM

## 2024-11-13 PROCEDURE — 97110 THERAPEUTIC EXERCISES: CPT | Mod: GO | Performed by: OCCUPATIONAL THERAPIST

## 2024-11-13 PROCEDURE — 97140 MANUAL THERAPY 1/> REGIONS: CPT | Mod: GO | Performed by: OCCUPATIONAL THERAPIST

## 2025-01-14 PROBLEM — M77.01 MEDIAL EPICONDYLITIS, RIGHT: Status: RESOLVED | Noted: 2024-10-28 | Resolved: 2025-01-14

## 2025-01-14 PROBLEM — M25.521 RIGHT ELBOW PAIN: Status: RESOLVED | Noted: 2024-10-28 | Resolved: 2025-01-14

## 2025-01-14 PROBLEM — M77.11 LATERAL EPICONDYLITIS, RIGHT ELBOW: Status: RESOLVED | Noted: 2024-10-28 | Resolved: 2025-01-14

## 2025-06-02 ENCOUNTER — PATIENT OUTREACH (OUTPATIENT)
Dept: CARE COORDINATION | Facility: CLINIC | Age: 40
End: 2025-06-02
Payer: COMMERCIAL

## 2025-07-07 ENCOUNTER — TELEPHONE (OUTPATIENT)
Dept: FAMILY MEDICINE | Facility: CLINIC | Age: 40
End: 2025-07-07
Payer: COMMERCIAL

## 2025-07-07 DIAGNOSIS — R16.1 SPLENOMEGALY: Primary | ICD-10-CM

## 2025-07-07 NOTE — TELEPHONE ENCOUNTER
Has enlarged spleen.  Needs repeat ultrasound of spleen.  Order is in.  Can call imaging scheduling to set up appointment date, time, and location that works best for you to have US of spleen [phone]  Please contact patient and remind him to schedule.    Liz MANNC

## 2025-07-07 NOTE — TELEPHONE ENCOUNTER
Called and spoke with Neo and was able to provide patient of scheduling number. Patient verbalizes and agrees with task given.     Ida Grace MA on 7/7/2025 at 10:01 AM

## 2025-08-02 ENCOUNTER — HEALTH MAINTENANCE LETTER (OUTPATIENT)
Age: 40
End: 2025-08-02